# Patient Record
Sex: FEMALE | Race: WHITE | NOT HISPANIC OR LATINO | URBAN - METROPOLITAN AREA
[De-identification: names, ages, dates, MRNs, and addresses within clinical notes are randomized per-mention and may not be internally consistent; named-entity substitution may affect disease eponyms.]

---

## 2023-08-05 ENCOUNTER — INPATIENT (INPATIENT)
Facility: HOSPITAL | Age: 88
LOS: 4 days | Discharge: HOME HEALTH | End: 2023-08-10
Attending: INTERNAL MEDICINE | Admitting: INTERNAL MEDICINE
Payer: MEDICARE

## 2023-08-05 VITALS
TEMPERATURE: 98 F | SYSTOLIC BLOOD PRESSURE: 140 MMHG | HEART RATE: 163 BPM | WEIGHT: 119.93 LBS | DIASTOLIC BLOOD PRESSURE: 81 MMHG | OXYGEN SATURATION: 96 % | HEIGHT: 62 IN | RESPIRATION RATE: 18 BRPM

## 2023-08-05 DIAGNOSIS — I47.1 SUPRAVENTRICULAR TACHYCARDIA: ICD-10-CM

## 2023-08-05 DIAGNOSIS — I10 ESSENTIAL (PRIMARY) HYPERTENSION: ICD-10-CM

## 2023-08-05 DIAGNOSIS — K21.9 GASTRO-ESOPHAGEAL REFLUX DISEASE WITHOUT ESOPHAGITIS: ICD-10-CM

## 2023-08-05 DIAGNOSIS — Z98.890 OTHER SPECIFIED POSTPROCEDURAL STATES: Chronic | ICD-10-CM

## 2023-08-05 DIAGNOSIS — E78.5 HYPERLIPIDEMIA, UNSPECIFIED: ICD-10-CM

## 2023-08-05 DIAGNOSIS — R79.89 OTHER SPECIFIED ABNORMAL FINDINGS OF BLOOD CHEMISTRY: ICD-10-CM

## 2023-08-05 DIAGNOSIS — I24.8 OTHER FORMS OF ACUTE ISCHEMIC HEART DISEASE: ICD-10-CM

## 2023-08-05 LAB
ALBUMIN SERPL ELPH-MCNC: 3.1 G/DL — LOW (ref 3.3–5)
ALP SERPL-CCNC: 74 U/L — SIGNIFICANT CHANGE UP (ref 40–120)
ALT FLD-CCNC: 40 U/L — SIGNIFICANT CHANGE UP (ref 12–78)
ANION GAP SERPL CALC-SCNC: 5 MMOL/L — SIGNIFICANT CHANGE UP (ref 5–17)
APTT BLD: 30 SEC — SIGNIFICANT CHANGE UP (ref 24.5–35.6)
AST SERPL-CCNC: 46 U/L — HIGH (ref 15–37)
BASOPHILS # BLD AUTO: 0.07 K/UL — SIGNIFICANT CHANGE UP (ref 0–0.2)
BASOPHILS NFR BLD AUTO: 0.7 % — SIGNIFICANT CHANGE UP (ref 0–2)
BILIRUB SERPL-MCNC: 0.4 MG/DL — SIGNIFICANT CHANGE UP (ref 0.2–1.2)
BUN SERPL-MCNC: 25 MG/DL — HIGH (ref 7–23)
CALCIUM SERPL-MCNC: 8.6 MG/DL — SIGNIFICANT CHANGE UP (ref 8.5–10.1)
CHLORIDE SERPL-SCNC: 109 MMOL/L — HIGH (ref 96–108)
CHOLEST SERPL-MCNC: 192 MG/DL — SIGNIFICANT CHANGE UP
CO2 SERPL-SCNC: 26 MMOL/L — SIGNIFICANT CHANGE UP (ref 22–31)
CREAT SERPL-MCNC: 0.88 MG/DL — SIGNIFICANT CHANGE UP (ref 0.5–1.3)
EGFR: 62 ML/MIN/1.73M2 — SIGNIFICANT CHANGE UP
EOSINOPHIL # BLD AUTO: 0.4 K/UL — SIGNIFICANT CHANGE UP (ref 0–0.5)
EOSINOPHIL NFR BLD AUTO: 4.2 % — SIGNIFICANT CHANGE UP (ref 0–6)
GLUCOSE SERPL-MCNC: 128 MG/DL — HIGH (ref 70–99)
HCT VFR BLD CALC: 40.3 % — SIGNIFICANT CHANGE UP (ref 34.5–45)
HDLC SERPL-MCNC: 79 MG/DL — SIGNIFICANT CHANGE UP
HGB BLD-MCNC: 12.9 G/DL — SIGNIFICANT CHANGE UP (ref 11.5–15.5)
IMM GRANULOCYTES NFR BLD AUTO: 0.3 % — SIGNIFICANT CHANGE UP (ref 0–0.9)
INR BLD: 0.95 RATIO — SIGNIFICANT CHANGE UP (ref 0.85–1.18)
LIPID PNL WITH DIRECT LDL SERPL: 88 MG/DL — SIGNIFICANT CHANGE UP
LYMPHOCYTES # BLD AUTO: 2.05 K/UL — SIGNIFICANT CHANGE UP (ref 1–3.3)
LYMPHOCYTES # BLD AUTO: 21.3 % — SIGNIFICANT CHANGE UP (ref 13–44)
MAGNESIUM SERPL-MCNC: 2.1 MG/DL — SIGNIFICANT CHANGE UP (ref 1.6–2.6)
MCHC RBC-ENTMCNC: 29.9 PG — SIGNIFICANT CHANGE UP (ref 27–34)
MCHC RBC-ENTMCNC: 32 G/DL — SIGNIFICANT CHANGE UP (ref 32–36)
MCV RBC AUTO: 93.3 FL — SIGNIFICANT CHANGE UP (ref 80–100)
MONOCYTES # BLD AUTO: 0.67 K/UL — SIGNIFICANT CHANGE UP (ref 0–0.9)
MONOCYTES NFR BLD AUTO: 7 % — SIGNIFICANT CHANGE UP (ref 2–14)
NEUTROPHILS # BLD AUTO: 6.39 K/UL — SIGNIFICANT CHANGE UP (ref 1.8–7.4)
NEUTROPHILS NFR BLD AUTO: 66.5 % — SIGNIFICANT CHANGE UP (ref 43–77)
NON HDL CHOLESTEROL: 113 MG/DL — SIGNIFICANT CHANGE UP
NRBC # BLD: 0 /100 WBCS — SIGNIFICANT CHANGE UP (ref 0–0)
NT-PROBNP SERPL-SCNC: 1628 PG/ML — HIGH (ref 0–450)
PLATELET # BLD AUTO: 239 K/UL — SIGNIFICANT CHANGE UP (ref 150–400)
POTASSIUM SERPL-MCNC: 4 MMOL/L — SIGNIFICANT CHANGE UP (ref 3.5–5.3)
POTASSIUM SERPL-SCNC: 4 MMOL/L — SIGNIFICANT CHANGE UP (ref 3.5–5.3)
PROT SERPL-MCNC: 6.7 GM/DL — SIGNIFICANT CHANGE UP (ref 6–8.3)
PROTHROM AB SERPL-ACNC: 11.3 SEC — SIGNIFICANT CHANGE UP (ref 9.5–13)
RBC # BLD: 4.32 M/UL — SIGNIFICANT CHANGE UP (ref 3.8–5.2)
RBC # FLD: 14.6 % — HIGH (ref 10.3–14.5)
SODIUM SERPL-SCNC: 140 MMOL/L — SIGNIFICANT CHANGE UP (ref 135–145)
TRIGL SERPL-MCNC: 147 MG/DL — SIGNIFICANT CHANGE UP
TROPONIN I, HIGH SENSITIVITY RESULT: 152.1 NG/L — HIGH
TROPONIN I, HIGH SENSITIVITY RESULT: 28.4 NG/L — SIGNIFICANT CHANGE UP
TROPONIN I, HIGH SENSITIVITY RESULT: 445.5 NG/L — HIGH
TSH SERPL-MCNC: 2.26 UIU/ML — SIGNIFICANT CHANGE UP (ref 0.36–3.74)
WBC # BLD: 9.61 K/UL — SIGNIFICANT CHANGE UP (ref 3.8–10.5)
WBC # FLD AUTO: 9.61 K/UL — SIGNIFICANT CHANGE UP (ref 3.8–10.5)

## 2023-08-05 PROCEDURE — 71045 X-RAY EXAM CHEST 1 VIEW: CPT | Mod: 26

## 2023-08-05 PROCEDURE — 93306 TTE W/DOPPLER COMPLETE: CPT | Mod: 26

## 2023-08-05 PROCEDURE — 99291 CRITICAL CARE FIRST HOUR: CPT

## 2023-08-05 PROCEDURE — 93356 MYOCRD STRAIN IMG SPCKL TRCK: CPT

## 2023-08-05 PROCEDURE — 93010 ELECTROCARDIOGRAM REPORT: CPT | Mod: 77

## 2023-08-05 PROCEDURE — 99222 1ST HOSP IP/OBS MODERATE 55: CPT

## 2023-08-05 PROCEDURE — 99223 1ST HOSP IP/OBS HIGH 75: CPT

## 2023-08-05 PROCEDURE — 93010 ELECTROCARDIOGRAM REPORT: CPT | Mod: 76

## 2023-08-05 RX ORDER — ENOXAPARIN SODIUM 100 MG/ML
40 INJECTION SUBCUTANEOUS EVERY 24 HOURS
Refills: 0 | Status: DISCONTINUED | OUTPATIENT
Start: 2023-08-05 | End: 2023-08-10

## 2023-08-05 RX ORDER — DILTIAZEM HCL 120 MG
30 CAPSULE, EXT RELEASE 24 HR ORAL ONCE
Refills: 0 | Status: COMPLETED | OUTPATIENT
Start: 2023-08-05 | End: 2023-08-05

## 2023-08-05 RX ORDER — LANOLIN ALCOHOL/MO/W.PET/CERES
3 CREAM (GRAM) TOPICAL AT BEDTIME
Refills: 0 | Status: DISCONTINUED | OUTPATIENT
Start: 2023-08-05 | End: 2023-08-10

## 2023-08-05 RX ORDER — ONDANSETRON 8 MG/1
4 TABLET, FILM COATED ORAL EVERY 8 HOURS
Refills: 0 | Status: DISCONTINUED | OUTPATIENT
Start: 2023-08-05 | End: 2023-08-07

## 2023-08-05 RX ORDER — METOPROLOL TARTRATE 50 MG
25 TABLET ORAL DAILY
Refills: 0 | Status: DISCONTINUED | OUTPATIENT
Start: 2023-08-05 | End: 2023-08-07

## 2023-08-05 RX ORDER — FUROSEMIDE 40 MG
20 TABLET ORAL DAILY
Refills: 0 | Status: DISCONTINUED | OUTPATIENT
Start: 2023-08-05 | End: 2023-08-07

## 2023-08-05 RX ORDER — ACETAMINOPHEN 500 MG
650 TABLET ORAL EVERY 6 HOURS
Refills: 0 | Status: DISCONTINUED | OUTPATIENT
Start: 2023-08-05 | End: 2023-08-10

## 2023-08-05 RX ORDER — DILTIAZEM HCL 120 MG
10 CAPSULE, EXT RELEASE 24 HR ORAL ONCE
Refills: 0 | Status: COMPLETED | OUTPATIENT
Start: 2023-08-05 | End: 2023-08-05

## 2023-08-05 RX ORDER — ATORVASTATIN CALCIUM 80 MG/1
20 TABLET, FILM COATED ORAL AT BEDTIME
Refills: 0 | Status: DISCONTINUED | OUTPATIENT
Start: 2023-08-05 | End: 2023-08-10

## 2023-08-05 RX ADMIN — Medication 30 MILLIGRAM(S): at 03:00

## 2023-08-05 RX ADMIN — Medication 10 MILLIGRAM(S): at 02:32

## 2023-08-05 RX ADMIN — Medication 20 MILLIGRAM(S): at 06:22

## 2023-08-05 RX ADMIN — ENOXAPARIN SODIUM 40 MILLIGRAM(S): 100 INJECTION SUBCUTANEOUS at 06:24

## 2023-08-05 RX ADMIN — ATORVASTATIN CALCIUM 20 MILLIGRAM(S): 80 TABLET, FILM COATED ORAL at 21:58

## 2023-08-05 NOTE — CONSULT NOTE ADULT - ASSESSMENT
89F HTN, HLD who experienced an episode of diaphoresis and chest pressure, found to be in SVT that broke with cardizem.    Troponin mildly elevated, possibly demand  -check TTE  -cont metoprolol; bradycardia may preclude safe uptitration  -cont IV furosemide as the pt remains volume overloaded

## 2023-08-05 NOTE — ED ADULT NURSE NOTE - OBJECTIVE STATEMENT
Patient A&Ox4 presents to ED c/o pressure in chest. As per patient she was at her assisted living facility tonight when she became diaphoretic and developed a "pressure" in her chest. Patient denies pain, SOB, numbness, tingling, headache, dizziness, unilateal weakness, cough, sore throat, f/c, N/V/D. Patietn denies similar episodes in past. PMH HTN, HLD. NKDA. Upon presentation to ED patient not diaphoretic but tachy in 160s. Patient placed in room on cardiac monitor, IV access placed, EKG completed, Dr. Zamarripa called to bedside.

## 2023-08-05 NOTE — ED PROVIDER NOTE - CRITICAL CARE ATTENDING CONTRIBUTION TO CARE
Upon my evaluation, this patient had a high probability of imminent or life-threatening deterioration due to SVT which required my direct attention, intervention, and personal management.  The patient has a  medical condition that impairs one or more vital organ systems.  Frequent personal assessment and adjustment of medical interventions was performed.      I have personally provided 45 minutes of critical care time exclusive of time spent on separately billable procedures. Time includes review of laboratory data, radiology results, discussion with consultants, patient and family; monitoring for potential decompensation, as well as time spent retrieving data and reviewing the chart and documenting the visit. Interventions were performed as documented above.

## 2023-08-05 NOTE — ED ADULT NURSE NOTE - NURSING ED SKIN COLOR
Show Applicator Variable?: Yes Detail Level: Detailed Consent: The patient's consent was obtained including but not limited to risks of crusting, scabbing, blistering, scarring, darker or lighter pigmentary change, recurrence, incomplete removal and infection. Render Post-Care Instructions In Note?: no Duration Of Freeze Thaw-Cycle (Seconds): 0 Post-Care Instructions: I reviewed with the patient in detail post-care instructions. Patient is to wear sunprotection, and avoid picking at any of the treated lesions. Pt may apply Vaseline to crusted or scabbing areas. normal for race

## 2023-08-05 NOTE — H&P ADULT - NSHPLABSRESULTS_GEN_ALL_CORE
LABS:                        12.9   9.61  )-----------( 239      ( 05 Aug 2023 02:43 )             40.3     08-05    140  |  109<H>  |  25<H>  ----------------------------<  128<H>  4.0   |  26  |  0.88    Ca    8.6      05 Aug 2023 02:43  Mg     2.1     08-05    TPro  6.7  /  Alb  3.1<L>  /  TBili  0.4  /  DBili  x   /  AST  46<H>  /  ALT  40  /  AlkPhos  74  08-05    PT/INR - ( 05 Aug 2023 02:43 )   PT: 11.3 sec;   INR: 0.95 ratio         CXR: cardiomegaly with pulmonary vascular congestion per my read. Official report pending.  PTT - ( 05 Aug 2023 02:43 )  PTT:30.0 sec

## 2023-08-05 NOTE — ED ADULT NURSE REASSESSMENT NOTE - NS ED NURSE REASSESS COMMENT FT1
Patient on cardiac monitor with IV access. Dr. Zamarripa at bedside. Valsalva maneuver attempted. Cardizem given IV push.

## 2023-08-05 NOTE — ED PROVIDER NOTE - PHYSICAL EXAMINATION
General appearance: Nontoxic appearing, conversant, afebrile    Eyes: anicteric sclerae, NELLIE, EOMI   HENT: Atraumatic; oropharynx clear, MMM and no ulcerations, no pharyngeal erythema or exudate   Neck: Trachea midline; Full range of motion, supple   Pulm: CTA bl, normal respiratory effort and no intercostal retractions, normal work of breathing   CV: Tachycardic, regular, No murmurs, rubs, or gallops   Abdomen: Soft, non-tender, non-distended; no guarding or rebound   Extremities: No peripheral edema, no gross deformities, FROM x4   Skin: Dry, normal temperature, turgor and texture; no rash   Psych: Appropriate affect, cooperative

## 2023-08-05 NOTE — ED ADULT NURSE NOTE - NSFALLUNIVINTERV_ED_ALL_ED
Bed/Stretcher in lowest position, wheels locked, appropriate side rails in place/Call bell, personal items and telephone in reach/Instruct patient to call for assistance before getting out of bed/chair/stretcher/Non-slip footwear applied when patient is off stretcher/[place] to call system/Physically safe environment - no spills, clutter or unnecessary equipment/Purposeful proactive rounding/Room/bathroom lighting operational, light cord in reach

## 2023-08-05 NOTE — H&P ADULT - ASSESSMENT
The patient is a 89y Female, a resident of an assisted living facility with significant PMH of HTN and HPL was BIBA with c/o chest pressure and elevated HR and BP for further evaluation.  Patient is very sharp and good historian as per her age. She says that she felt perspiration and sweating with central chest pressure, and woke from her sleep due to this. She went down to see the facility nurse, and she was told that her HR BP were high. She was also feeling abnormal sensation in her chest at that time, but denied typical chest pain or sob.  Upon arrival in ED, she was found to be in SVT with HR of 162 bpm. Cardizem 10 mg IV and 20 mg po given in ED, and converted to sinus rhythm. Currently, she is in sinus rhythm with HR in 60s, and she is feeling much better.  She says that she takes only two pills daily- one for BP in the morning and other for cholesterol in the evening. She thinks that she takes Bystolic and Lipitor, but not able to remember their strength. Otherwise, she denies fever, chills, chest pain, sob, palpitation, N/V, abdominal pain, diarrhea or dysuria. Denies smoking, ETOH or substance abuse. her father had some type of cancer, and mother  at 92 years of natural cause.      # SVT possibly due to developing CHF.  Converted to SR after Cardizem 10 mg IV and 20 mg po given in ED.  Pro-BNP elevated. Troponin x1 normal.  CXR: cardiomegaly with pulmonary vascular congestion per my read. Official report pending.  Admit to telemetry.  Cardiac diet.  Lipid panel, troponin, and TSH level.  2D Echo in am.  Bystolic not available in inpatient pharmacy.  Will start Toprol-XL.  Lasix 20 mg IV x 1 dose.  Consult cardiology in am.    # HTN and HPL  Stable and controlled.  Continue her Lipitor.    # DVT prophylaxis: Lovenox sq daily.    Plan of care d/w the patient in detail at bedside, and she verbalized understanding.

## 2023-08-05 NOTE — H&P ADULT - HISTORY OF PRESENT ILLNESS
Chief Complaint: heavy sensation in the chest.    The patient is a 89y Female, a resident of an assisted living facility with significant PMH of HTN and HPL was BIBA with c/o chest pressure and elevated HR and BP for further evaluation.  Patient is very sharp and good historian as per her age. She says that she felt perspiration and sweating with central chest pressure, and woke from her sleep due to this. She went down to see the facility nurse, and she was told that her HR BP were high. She was also feeling abnormal sensation in her chest at that time, but denied typical chest pain or sob.  Upon arrival in ED, she was found to be in SVT with HR of 162 bpm. Cardizem 10 mg IV and 20 mg po given in ED, and converted to sinus rhythm. Currently, she is in sinus rhythm with HR in 60s, and she is feeling much better.  She says that she takes only two pills daily- one for BP in the morning and other for cholesterol in the evening. She thinks that she takes Bystolic and Lipitor, but not able to remember their strength. Otherwise, she denies fever, chills, chest pain, sob, palpitation, N/V, abdominal pain, diarrhea or dysuria. Denies smoking, ETOH or substance abuse. her father had some type of cancer, and mother  at 92 years of natural cause.    Significant labs: Pro-BNP 1628, Troponin 28, Mg 2.1, K 4.0, otherwise, CBC and CMP wnl.    Initial  bpm in SVT and latter on 73 bpm in SR.    CXR: cardiomegaly with pulmonary vascular congestion per my read. Official report pending.

## 2023-08-05 NOTE — CONSULT NOTE ADULT - SUBJECTIVE AND OBJECTIVE BOX
Cardiology Initial Consult    [facility] - Cardiology at [place]  [address] [place] [address], [place] [address]  Office: [phone]  Fax: [phone]    CHIEF COMPLAINT:  diaphoresis    HISTORY OF PRESENT ILLNESS:  89F HTN, HLD who experienced an episode of diaphoresis and chest pressure, found to be in SVT that broke with cardizem.  Feels some HALL a bit more than before.    Allergies  No Known Allergies    Intolerances    MEDICATIONS:  enoxaparin Injectable 40 milliGRAM(s) SubCutaneous every 24 hours  furosemide   Injectable 20 milliGRAM(s) IV Push daily  metoprolol succinate ER 25 milliGRAM(s) Oral daily  acetaminophen     Tablet .. 650 milliGRAM(s) Oral every 6 hours PRN  melatonin 3 milliGRAM(s) Oral at bedtime PRN  ondansetron Injectable 4 milliGRAM(s) IV Push every 8 hours PRN  aluminum hydroxide/magnesium hydroxide/simethicone Suspension 30 milliLiter(s) Oral every 4 hours PRN  atorvastatin 20 milliGRAM(s) Oral at bedtime    PAST MEDICAL & SURGICAL HISTORY:  HTN (hypertension)  Hyperlipidemia  S/P lumpectomy, left breast    FAMILY HISTORY:    SOCIAL HISTORY:    [x] Non-smoker  [ ] Smoker  [ ] Alcohol    Review of Systems:  Constitutional: [ -] Fever [ -] Chills [ ] Fatigue [ ] Weight change   HEENT: [ ] Blurred vision [ ] Eye pain [- ] Headache [ ] Runny nose [ ] Sore throat   Respiratory: [ ] Cough [ ] Wheezing [ x] Shortness of breath  Cardiovascular: [x ] Chest Pain [ ] Palpitations [ x] HALL [ ] PND [ ] Orthopnea  Gastrointestinal: [- ] Abdominal Pain [ ] Diarrhea [ ] Constipation [ ] Hemorrhoids [ ] Nausea [ ] Vomiting  Genitourinary: [ ] Nocturia [ -] Dysuria [ ] Incontinence  Extremities: [-] Swelling [ ] Joint Pain  Neurologic: [ ] Focal deficit [ ] Paresthesias [- ] Syncope  Skin: [- ] Rash [ ] Ecchymoses [ ] Wounds [ ] Lesions  Psychiatry: [- ] Depression [ ] Suicidal/Homicidal ideation [ ] Anxiety [ ] Sleep disturbances  [x ] 10 point review of systems is otherwise negative except as mentioned above            [ ]Unable to obtain    PHYSICAL EXAM:  T(C): 36.6 (08-05-23 @ 16:30), Max: 36.8 (08-05-23 @ 05:12)  HR: 69 (08-05-23 @ 16:30) (69 - 166)  BP: 163/91 (08-05-23 @ 16:30) (121/75 - 163/91)  RR: 18 (08-05-23 @ 16:30) (17 - 30)  SpO2: 98% (08-05-23 @ 16:30) (94% - 100%)  Wt(kg): --  I&O's Summary    05 Aug 2023 07:01  -  05 Aug 2023 16:37  --------------------------------------------------------  IN: 360 mL / OUT: 0 mL / NET: 360 mL    Appearance: mild dyspnea  HEENT:   mmm  Cardiovascular: Normal S1 S2, +elevated JVP, 2/6 systolic murmurs RUSB, no edema  Respiratory: Lungs clear to auscultation, mild crackles anteriorly, good air movement  Psychiatry: A & O x 3, Mood & affect appropriate  Gastrointestinal:  soft nt  Skin: No rashes, no ecchymoses, no cyanosis	  Neurologic: grossly non-focal    LABS:	 	  CBC Full  -  ( 05 Aug 2023 02:43 )  WBC Count : 9.61 K/uL  Hemoglobin : 12.9 g/dL  Hematocrit : 40.3 %  Platelet Count - Automated : 239 K/uL    08-05  140  |  109<H>  |  25<H>  ----------------------------<  128<H>  4.0   |  26  |  0.88    Ca    8.6      05 Aug 2023 02:43  Mg     2.1     08-05    TPro  6.7  /  Alb  3.1<L>  /  TBili  0.4  /  DBili  x   /  AST  46<H>  /  ALT  40  /  AlkPhos  74  08-05      proBNP:   Lipid Profile:   HgA1c:   TSH: Thyroid Stimulating Hormone, Serum: 2.260 uIU/mL (08-05 @ 05:02)      CARDIAC MARKERS:  Troponin I, High Sensitivity Result: 445.5 ng/L (08-05-23 @ 14:47)  Troponin I, High Sensitivity Result: 152.1 ng/L (08-05-23 @ 05:02)    TELEMETRY: 	  SR/SB  ECG:  	SVT, possible AVNRT, ?ST depressions  RADIOLOGY:  OTHER: 	    PREVIOUS DIAGNOSTIC TESTING:    [ ] Echocardiogram:   [ ] Catheterization:  [ ] Stress Test:

## 2023-08-05 NOTE — ED PROVIDER NOTE - CLINICAL SUMMARY MEDICAL DECISION MAKING FREE TEXT BOX
89yo female with pmh htn, hld, p/w chest pressure.  Woke her from sleep tonight.  Never happened before.  No sob, fever, cough, edema.  Denies cardiac hx.  Noted to be tachy 160s, svt on ecg.  Normotensive.  Attempted vagal maneuvers, blowing into syringe without success.  Given diltiazem with improvement in rate and no longer with cp.  Will get labs eval for electrolytes, acs.  Plan for labs, xr, tele, reassess.

## 2023-08-05 NOTE — ED ADULT NURSE NOTE - CHPI ED NUR SYMPTOMS NEG
denies pain, SOB, numbness, tingling, headache, dizziness, unilateal weakness, cough, sore throat, f/c, N/V/D

## 2023-08-05 NOTE — ED PROVIDER NOTE - PROGRESS NOTE DETAILS
Dallin: Sinus on monitor 60-70s.  Intermittently drops 40s junctional but resolves in seconds and asymptomatic during this, normotensive.  Given ccb which is likely cause.  Will admit for further tele monitoring and evaluation.

## 2023-08-05 NOTE — ED ADULT NURSE NOTE - ED STAT RN HANDOFF DETAILS
Report given to JOSE LUIS Ordaz. RN made aware of patient PMH and HPI. RN made aware of medications given in ED, IV access, lab results. VSS, no signs of distress noted or verbalized. Patient A&Ox4. RN made aware that patient HR occasionally dips to 40s.

## 2023-08-06 PROCEDURE — 99232 SBSQ HOSP IP/OBS MODERATE 35: CPT

## 2023-08-06 PROCEDURE — 99222 1ST HOSP IP/OBS MODERATE 55: CPT | Mod: AI

## 2023-08-06 RX ORDER — IPRATROPIUM BROMIDE 0.2 MG/ML
500 SOLUTION, NON-ORAL INHALATION ONCE
Refills: 0 | Status: COMPLETED | OUTPATIENT
Start: 2023-08-06 | End: 2023-08-06

## 2023-08-06 RX ORDER — LOSARTAN POTASSIUM 100 MG/1
50 TABLET, FILM COATED ORAL DAILY
Refills: 0 | Status: DISCONTINUED | OUTPATIENT
Start: 2023-08-06 | End: 2023-08-09

## 2023-08-06 RX ORDER — LEVALBUTEROL 1.25 MG/.5ML
0.63 SOLUTION, CONCENTRATE RESPIRATORY (INHALATION) EVERY 6 HOURS
Refills: 0 | Status: DISCONTINUED | OUTPATIENT
Start: 2023-08-06 | End: 2023-08-10

## 2023-08-06 RX ORDER — LABETALOL HCL 100 MG
10 TABLET ORAL ONCE
Refills: 0 | Status: COMPLETED | OUTPATIENT
Start: 2023-08-06 | End: 2023-08-06

## 2023-08-06 RX ADMIN — Medication 500 MICROGRAM(S): at 00:53

## 2023-08-06 RX ADMIN — Medication 10 MILLIGRAM(S): at 01:18

## 2023-08-06 RX ADMIN — Medication 10 MILLIGRAM(S): at 08:34

## 2023-08-06 RX ADMIN — LOSARTAN POTASSIUM 50 MILLIGRAM(S): 100 TABLET, FILM COATED ORAL at 12:41

## 2023-08-06 RX ADMIN — Medication 20 MILLIGRAM(S): at 05:42

## 2023-08-06 RX ADMIN — ENOXAPARIN SODIUM 40 MILLIGRAM(S): 100 INJECTION SUBCUTANEOUS at 05:42

## 2023-08-06 RX ADMIN — Medication 25 MILLIGRAM(S): at 05:43

## 2023-08-06 RX ADMIN — ATORVASTATIN CALCIUM 20 MILLIGRAM(S): 80 TABLET, FILM COATED ORAL at 22:03

## 2023-08-06 NOTE — PROGRESS NOTE ADULT - ASSESSMENT
89F HTN, HLD who experienced an episode of diaphoresis and chest pressure, found to be in SVT that broke with cardizem.    Troponin mildly elevated, possibly demand  -check TTE  -increase metoprolol  -BP control - on losartan  -nearing euvolemic, likely can stop IV diuretics

## 2023-08-06 NOTE — PROGRESS NOTE ADULT - ASSESSMENT
The patient is a 89y Female, a resident of an assisted living facility with significant PMH of HTN and HPL was BIBA with c/o chest pressure and elevated HR and BP for further evaluation.  Patient is very sharp and good historian as per her age. She says that she felt perspiration and sweating with central chest pressure, and woke from her sleep due to this. She went down to see the facility nurse, and she was told that her HR BP were high. She was also feeling abnormal sensation in her chest at that time, but denied typical chest pain or sob.  Upon arrival in ED, she was found to be in SVT with HR of 162 bpm. Cardizem 10 mg IV and 20 mg po given in ED, and converted to sinus rhythm. Currently, she is in sinus rhythm with HR in 60s, and she is feeling much better.  She says that she takes only two pills daily- one for BP in the morning and other for cholesterol in the evening. She thinks that she takes Bystolic and Lipitor, but not able to remember their strength. Otherwise, she denies fever, chills, chest pain, sob, palpitation, N/V, abdominal pain, diarrhea or dysuria. Denies smoking, ETOH or substance abuse. her father had some type of cancer, and mother  at 92 years of natural cause.      # SVT, possibly due to developing CHF.  Converted to SR after Cardizem 10 mg IV and 20 mg po given in ED.  Lipid panel, troponin, and TSH level.  2D Echo  Continue BBlocker.  Cardiology f/u appreciated.    Lasix 20 mg IV x 1 dose.  Consult cardiology in am.    # HTN and HPL  Stable and controlled.  Continue her Lipitor.    # DVT prophylaxis: Lovenox sq daily.   The patient is a 89y Female, a resident of an assisted living facility with significant PMH of HTN and HPL was BIBA with c/o chest pressure and elevated HR and BP for further evaluation.  Patient is very sharp and good historian as per her age. She says that she felt perspiration and sweating with central chest pressure, and woke from her sleep due to this. She went down to see the facility nurse, and she was told that her HR BP were high. She was also feeling abnormal sensation in her chest at that time, but denied typical chest pain or sob.  Upon arrival in ED, she was found to be in SVT with HR of 162 bpm. Cardizem 10 mg IV and 20 mg po given in ED, and converted to sinus rhythm. Currently, she is in sinus rhythm with HR in 60s, and she is feeling much better.  She says that she takes only two pills daily- one for BP in the morning and other for cholesterol in the evening. She thinks that she takes Bystolic and Lipitor, but not able to remember their strength. Otherwise, she denies fever, chills, chest pain, sob, palpitation, N/V, abdominal pain, diarrhea or dysuria. Denies smoking, ETOH or substance abuse. her father had some type of cancer, and mother  at 92 years of natural cause.      # SVT, possibly due to developing CHF.  Converted to SR after Cardizem 10 mg IV and 20 mg po given in ED.  Lipid panel, troponin, and TSH level.  2D Echo  Continue BBlocker.  Cardiology f/u appreciated.    Lasix 20 mg IV x 1 dose.    # Elevated Troponin - slight elevated trending upward.  Pt without symtpoms.  ? demand ischemia.  Cardiology input noted.  Will continue to trend.        # HTN and HPL  Stable and controlled.  Continue her Lipitor.    # DVT prophylaxis: Lovenox sq daily.

## 2023-08-06 NOTE — PHYSICAL THERAPY INITIAL EVALUATION ADULT - PERTINENT HX OF CURRENT PROBLEM, REHAB EVAL
Patient admitted with chest pressure and increased HR and blood pressure. Elevated troponin attributed to demand per cardiology.

## 2023-08-06 NOTE — PROGRESS NOTE ADULT - SUBJECTIVE AND OBJECTIVE BOX
Cardiology Progress Note    Interval Events:  No significant events      MEDICATIONS:  enoxaparin Injectable 40 milliGRAM(s) SubCutaneous every 24 hours  furosemide   Injectable 20 milliGRAM(s) IV Push daily  losartan 50 milliGRAM(s) Oral daily  metoprolol succinate ER 25 milliGRAM(s) Oral daily  levalbuterol Inhalation 0.63 milliGRAM(s) Inhalation every 6 hours PRN  acetaminophen     Tablet .. 650 milliGRAM(s) Oral every 6 hours PRN  melatonin 3 milliGRAM(s) Oral at bedtime PRN  ondansetron Injectable 4 milliGRAM(s) IV Push every 8 hours PRN  aluminum hydroxide/magnesium hydroxide/simethicone Suspension 30 milliLiter(s) Oral every 4 hours PRN  atorvastatin 20 milliGRAM(s) Oral at bedtime    PHYSICAL EXAM:  T(C): 37.3 (08-06-23 @ 12:15), Max: 37.3 (08-06-23 @ 12:15)  HR: 84 (08-06-23 @ 12:15) (69 - 97)  BP: 133/79 (08-06-23 @ 12:15) (130/68 - 216/131)  RR: 17 (08-06-23 @ 12:15) (17 - 18)  SpO2: 96% (08-06-23 @ 12:15) (93% - 100%)  Wt(kg): --  I&O's Summary    05 Aug 2023 07:01  -  06 Aug 2023 07:00  --------------------------------------------------------  IN: 360 mL / OUT: 900 mL / NET: -540 mL    Appearance: NAD  HEENT:   mmm  Cardiovascular: Normal S1 S2, nl  JVP, 2/6 systolic murmurs RUSB, no edema  Respiratory: Lungs clear to auscultation, good air movement  Psychiatry: A & O x 3, Mood & affect appropriate  Gastrointestinal:  soft nt  Skin: No rashes, no ecchymoses, no cyanosis	  Neurologic: grossly non-focal    LABS:	 	  CBC Full  -  ( 05 Aug 2023 02:43 )  WBC Count : 9.61 K/uL  Hemoglobin : 12.9 g/dL  Hematocrit : 40.3 %  Platelet Count - Automated : 239 K/uL  Mean Cell Volume : 93.3 fl  Mean Cell Hemoglobin : 29.9 pg  Mean Cell Hemoglobin Concentration : 32.0 g/dL  Auto Neutrophil # : 6.39 K/uL  Auto Lymphocyte # : 2.05 K/uL  Auto Monocyte # : 0.67 K/uL  Auto Eosinophil # : 0.40 K/uL  Auto Basophil # : 0.07 K/uL  Auto Neutrophil % : 66.5 %  Auto Lymphocyte % : 21.3 %  Auto Monocyte % : 7.0 %  Auto Eosinophil % : 4.2 %  Auto Basophil % : 0.7 %    08-05    140  |  109<H>  |  25<H>  ----------------------------<  128<H>  4.0   |  26  |  0.88    Ca    8.6      05 Aug 2023 02:43  Mg     2.1     08-05    TPro  6.7  /  Alb  3.1<L>  /  TBili  0.4  /  DBili  x   /  AST  46<H>  /  ALT  40  /  AlkPhos  74  08-05      proBNP:   Lipid Profile:   HgA1c:   TSH: Thyroid Stimulating Hormone, Serum: 2.260 uIU/mL (08-05 @ 05:02)      CARDIAC MARKERS:  Troponin I, High Sensitivity Result: 445.5 ng/L (08-05-23 @ 14:47)  Troponin I, High Sensitivity Result: 152.1 ng/L (08-05-23 @ 05:02)  Troponin I, High Sensitivity Result: 28.4 ng/L (08-05-23 @ 02:43)    TELEMETRY: 	  SR 90s  ECG:  	  RADIOLOGY:  OTHER: 	    PREVIOUS DIAGNOSTIC TESTING:    [ ] Echocardiogram:   [ ] Catheterization:  [ ] Stress Test:

## 2023-08-06 NOTE — PROGRESS NOTE ADULT - SUBJECTIVE AND OBJECTIVE BOX
Patient: MISAEL STACK [id number] 89y Female                            Hospitalist Attending Note    No complaints.  No chest pain / palpitations. No SOB.     ____________________PHYSICAL EXAM:  GENERAL:  NAD Alert and Oriented x 3 Pleasant.   HEENT: NCAT  CARDIOVASCULAR:  S1, S2  LUNGS: CTAB  ABDOMEN:  soft, (-) tenderness, (-) distension, (+) bowel sounds, (-) guarding, (-) rebound (-) rigidity  EXTREMITIES:  no cyanosis / clubbing / edema.   ____________________     VITALS:  Vital Signs Last 24 Hrs  T(C): 37.3 (06 Aug 2023 12:15), Max: 37.3 (06 Aug 2023 12:15)  T(F): 99.1 (06 Aug 2023 12:15), Max: 99.1 (06 Aug 2023 12:15)  HR: 75 (06 Aug 2023 15:16) (70 - 97)  BP: 156/94 (06 Aug 2023 15:16) (130/68 - 216/131)  BP(mean): --  RR: 18 (06 Aug 2023 15:16) (17 - 18)  SpO2: 96% (06 Aug 2023 15:16) (93% - 100%)    Parameters below as of 06 Aug 2023 15:16  Patient On (Oxygen Delivery Method): room air     Daily     Daily Weight in k.6 (06 Aug 2023 04:41)  CAPILLARY BLOOD GLUCOSE        I&O's Summary    05 Aug 2023 07:01  -  06 Aug 2023 07:00  --------------------------------------------------------  IN: 360 mL / OUT: 900 mL / NET: -540 mL        HISTORY:  PAST MEDICAL & SURGICAL HISTORY:  HTN (hypertension)      Hyperlipidemia      S/P lumpectomy, left breast      Allergies    No Known Allergies    Intolerances       LABS:                        12.9   9.61  )-----------( 239      ( 05 Aug 2023 02:43 )             40.3     08-05    140  |  109<H>  |  25<H>  ----------------------------<  128<H>  4.0   |  26  |  0.88    Ca    8.6      05 Aug 2023 02:43  Mg     2.1     08-    TPro  6.7  /  Alb  3.1<L>  /  TBili  0.4  /  DBili  x   /  AST  46<H>  /  ALT  40  /  AlkPhos  74  08-05    PT/INR - ( 05 Aug 2023 02:43 )   PT: 11.3 sec;   INR: 0.95 ratio         PTT - ( 05 Aug 2023 02:43 )  PTT:30.0 sec  LIVER FUNCTIONS - ( 05 Aug 2023 02:43 )  Alb: 3.1 g/dL / Pro: 6.7 gm/dL / ALK PHOS: 74 U/L / ALT: 40 U/L / AST: 46 U/L / GGT: x           Urinalysis Basic - ( 05 Aug 2023 02:43 )    Color: x / Appearance: x / SG: x / pH: x  Gluc: 128 mg/dL / Ketone: x  / Bili: x / Urobili: x   Blood: x / Protein: x / Nitrite: x   Leuk Esterase: x / RBC: x / WBC x   Sq Epi: x / Non Sq Epi: x / Bacteria: x              MEDICATIONS:  MEDICATIONS  (STANDING):  atorvastatin 20 milliGRAM(s) Oral at bedtime  enoxaparin Injectable 40 milliGRAM(s) SubCutaneous every 24 hours  furosemide   Injectable 20 milliGRAM(s) IV Push daily  losartan 50 milliGRAM(s) Oral daily  metoprolol succinate ER 25 milliGRAM(s) Oral daily    MEDICATIONS  (PRN):  acetaminophen     Tablet .. 650 milliGRAM(s) Oral every 6 hours PRN Temp greater or equal to 38C (100.4F), Mild Pain (1 - 3)  aluminum hydroxide/magnesium hydroxide/simethicone Suspension 30 milliLiter(s) Oral every 4 hours PRN Dyspepsia  levalbuterol Inhalation 0.63 milliGRAM(s) Inhalation every 6 hours PRN shortness of breath and/or wheezing  melatonin 3 milliGRAM(s) Oral at bedtime PRN Insomnia  ondansetron Injectable 4 milliGRAM(s) IV Push every 8 hours PRN Nausea and/or Vomiting

## 2023-08-06 NOTE — PATIENT PROFILE ADULT - FALL HARM RISK - HARM RISK INTERVENTIONS
Assistance with ambulation/Assistance OOB with selected safe patient handling equipment/Communicate Risk of Fall with Harm to all staff/Discuss with provider need for PT consult/Monitor gait and stability/Reinforce activity limits and safety measures with patient and family/Tailored Fall Risk Interventions/Use of alarms - bed, chair and/or voice tab/Visual Cue: Yellow wristband and red socks/Bed in lowest position, wheels locked, appropriate side rails in place/Call bell, personal items and telephone in reach/Instruct patient to call for assistance before getting out of bed or chair/Non-slip footwear when patient is out of bed/[place] to call system/Physically safe environment - no spills, clutter or unnecessary equipment/Purposeful Proactive Rounding/Room/bathroom lighting operational, light cord in reach

## 2023-08-06 NOTE — PHYSICAL THERAPY INITIAL EVALUATION ADULT - REFERRING PHYSICIAN, REHAB EVAL
06/21/17 1330   Final Note   Assessment Type Final Discharge Note   Discharge Disposition Home   Discharge planning education complete? Yes   Hospital Follow Up  Appt(s) scheduled? Yes   Discharge plans and expectations educations in teach back method with documentation complete? Yes   Offered [facility] -- Bedside Delivery? n/a   Discharge/Hospital Encounter Summary to (non-[facility]) PCP Yes   Referral to Outpatient Case Management complete? n/a   Referral to / orders for Home Health Complete? n/a   30 day supply of medicines given at discharge, if documented non-compliance / non-adherence? n/a   Any social issues identified prior to discharge? n/a   Did you assess the readiness or willingness of the family or caregiver to support self management of care? Yes   Right Care Referral Info   Post Acute Recommendation No Care      Elmo Metcalf

## 2023-08-07 LAB
ANION GAP SERPL CALC-SCNC: 5 MMOL/L — SIGNIFICANT CHANGE UP (ref 5–17)
BUN SERPL-MCNC: 28 MG/DL — HIGH (ref 7–23)
CALCIUM SERPL-MCNC: 9.1 MG/DL — SIGNIFICANT CHANGE UP (ref 8.5–10.1)
CHLORIDE SERPL-SCNC: 103 MMOL/L — SIGNIFICANT CHANGE UP (ref 96–108)
CK MB BLD-MCNC: 2.7 % — SIGNIFICANT CHANGE UP (ref 0–3.5)
CK MB CFR SERPL CALC: 2.1 NG/ML — SIGNIFICANT CHANGE UP (ref 0.5–3.6)
CK SERPL-CCNC: 79 U/L — SIGNIFICANT CHANGE UP (ref 26–192)
CO2 SERPL-SCNC: 32 MMOL/L — HIGH (ref 22–31)
CREAT SERPL-MCNC: 0.88 MG/DL — SIGNIFICANT CHANGE UP (ref 0.5–1.3)
EGFR: 62 ML/MIN/1.73M2 — SIGNIFICANT CHANGE UP
GLUCOSE SERPL-MCNC: 140 MG/DL — HIGH (ref 70–99)
HCT VFR BLD CALC: 43.8 % — SIGNIFICANT CHANGE UP (ref 34.5–45)
HGB BLD-MCNC: 14.1 G/DL — SIGNIFICANT CHANGE UP (ref 11.5–15.5)
MCHC RBC-ENTMCNC: 29.6 PG — SIGNIFICANT CHANGE UP (ref 27–34)
MCHC RBC-ENTMCNC: 32.2 G/DL — SIGNIFICANT CHANGE UP (ref 32–36)
MCV RBC AUTO: 91.8 FL — SIGNIFICANT CHANGE UP (ref 80–100)
NRBC # BLD: 0 /100 WBCS — SIGNIFICANT CHANGE UP (ref 0–0)
PLATELET # BLD AUTO: 275 K/UL — SIGNIFICANT CHANGE UP (ref 150–400)
POTASSIUM SERPL-MCNC: 3.1 MMOL/L — LOW (ref 3.5–5.3)
POTASSIUM SERPL-SCNC: 3.1 MMOL/L — LOW (ref 3.5–5.3)
RBC # BLD: 4.77 M/UL — SIGNIFICANT CHANGE UP (ref 3.8–5.2)
RBC # FLD: 14.7 % — HIGH (ref 10.3–14.5)
SODIUM SERPL-SCNC: 140 MMOL/L — SIGNIFICANT CHANGE UP (ref 135–145)
TROPONIN I, HIGH SENSITIVITY RESULT: 98.7 NG/L — HIGH
WBC # BLD: 8.52 K/UL — SIGNIFICANT CHANGE UP (ref 3.8–10.5)
WBC # FLD AUTO: 8.52 K/UL — SIGNIFICANT CHANGE UP (ref 3.8–10.5)

## 2023-08-07 PROCEDURE — 99232 SBSQ HOSP IP/OBS MODERATE 35: CPT | Mod: FS

## 2023-08-07 PROCEDURE — 99232 SBSQ HOSP IP/OBS MODERATE 35: CPT

## 2023-08-07 PROCEDURE — 93010 ELECTROCARDIOGRAM REPORT: CPT

## 2023-08-07 RX ORDER — HYDRALAZINE HCL 50 MG
10 TABLET ORAL ONCE
Refills: 0 | Status: COMPLETED | OUTPATIENT
Start: 2023-08-07 | End: 2023-08-07

## 2023-08-07 RX ORDER — METOPROLOL TARTRATE 50 MG
50 TABLET ORAL DAILY
Refills: 0 | Status: DISCONTINUED | OUTPATIENT
Start: 2023-08-08 | End: 2023-08-09

## 2023-08-07 RX ORDER — POTASSIUM CHLORIDE 20 MEQ
40 PACKET (EA) ORAL EVERY 4 HOURS
Refills: 0 | Status: COMPLETED | OUTPATIENT
Start: 2023-08-07 | End: 2023-08-07

## 2023-08-07 RX ORDER — POTASSIUM CHLORIDE 20 MEQ
40 PACKET (EA) ORAL ONCE
Refills: 0 | Status: COMPLETED | OUTPATIENT
Start: 2023-08-07 | End: 2023-08-07

## 2023-08-07 RX ORDER — METOPROLOL TARTRATE 50 MG
25 TABLET ORAL ONCE
Refills: 0 | Status: COMPLETED | OUTPATIENT
Start: 2023-08-07 | End: 2023-08-07

## 2023-08-07 RX ORDER — METOPROLOL TARTRATE 50 MG
50 TABLET ORAL DAILY
Refills: 0 | Status: DISCONTINUED | OUTPATIENT
Start: 2023-08-08 | End: 2023-10-07

## 2023-08-07 RX ORDER — METOPROLOL TARTRATE 50 MG
5 TABLET ORAL ONCE
Refills: 0 | Status: COMPLETED | OUTPATIENT
Start: 2023-08-07 | End: 2023-08-07

## 2023-08-07 RX ORDER — PANTOPRAZOLE SODIUM 20 MG/1
40 TABLET, DELAYED RELEASE ORAL
Refills: 0 | Status: DISCONTINUED | OUTPATIENT
Start: 2023-08-07 | End: 2023-08-10

## 2023-08-07 RX ORDER — METOPROLOL TARTRATE 50 MG
50 TABLET ORAL DAILY
Refills: 0 | Status: DISCONTINUED | OUTPATIENT
Start: 2023-08-07 | End: 2023-08-07

## 2023-08-07 RX ORDER — FUROSEMIDE 40 MG
20 TABLET ORAL DAILY
Refills: 0 | Status: DISCONTINUED | OUTPATIENT
Start: 2023-08-08 | End: 2023-08-10

## 2023-08-07 RX ORDER — ONDANSETRON 8 MG/1
8 TABLET, FILM COATED ORAL EVERY 6 HOURS
Refills: 0 | Status: DISCONTINUED | OUTPATIENT
Start: 2023-08-07 | End: 2023-08-10

## 2023-08-07 RX ADMIN — ONDANSETRON 4 MILLIGRAM(S): 8 TABLET, FILM COATED ORAL at 15:25

## 2023-08-07 RX ADMIN — Medication 25 MILLIGRAM(S): at 13:08

## 2023-08-07 RX ADMIN — Medication 25 MILLIGRAM(S): at 05:25

## 2023-08-07 RX ADMIN — Medication 40 MILLIEQUIVALENT(S): at 21:44

## 2023-08-07 RX ADMIN — Medication 3 MILLIGRAM(S): at 21:49

## 2023-08-07 RX ADMIN — LOSARTAN POTASSIUM 50 MILLIGRAM(S): 100 TABLET, FILM COATED ORAL at 05:26

## 2023-08-07 RX ADMIN — Medication 40 MILLIEQUIVALENT(S): at 13:09

## 2023-08-07 RX ADMIN — Medication 5 MILLIGRAM(S): at 00:37

## 2023-08-07 RX ADMIN — Medication 10 MILLIGRAM(S): at 16:41

## 2023-08-07 RX ADMIN — PANTOPRAZOLE SODIUM 40 MILLIGRAM(S): 20 TABLET, DELAYED RELEASE ORAL at 13:08

## 2023-08-07 RX ADMIN — Medication 40 MILLIEQUIVALENT(S): at 18:01

## 2023-08-07 RX ADMIN — ENOXAPARIN SODIUM 40 MILLIGRAM(S): 100 INJECTION SUBCUTANEOUS at 05:42

## 2023-08-07 RX ADMIN — ATORVASTATIN CALCIUM 20 MILLIGRAM(S): 80 TABLET, FILM COATED ORAL at 21:38

## 2023-08-07 RX ADMIN — Medication 30 MILLILITER(S): at 16:41

## 2023-08-07 RX ADMIN — Medication 650 MILLIGRAM(S): at 11:16

## 2023-08-07 RX ADMIN — Medication 20 MILLIGRAM(S): at 05:26

## 2023-08-07 RX ADMIN — Medication 650 MILLIGRAM(S): at 10:19

## 2023-08-07 RX ADMIN — Medication 10 MILLIGRAM(S): at 05:25

## 2023-08-07 NOTE — PROGRESS NOTE ADULT - ASSESSMENT
The patient is a 89y Female, a resident of an assisted living facility with significant PMH of HTN and HPL was BIBA with c/o chest pressure and elevated HR and BP for further evaluation.  Patient is very sharp and good historian as per her age. She says that she felt perspiration and sweating with central chest pressure, and woke from her sleep due to this. She went down to see the facility nurse, and she was told that her HR BP were high. She was also feeling abnormal sensation in her chest at that time, but denied typical chest pain or sob.  Upon arrival in ED, she was found to be in SVT with HR of 162 bpm. Cardizem 10 mg IV and 20 mg po given in ED, and converted to sinus rhythm. Currently, she is in sinus rhythm with HR in 60s, and she is feeling much better.  She says that she takes only two pills daily- one for BP in the morning and other for cholesterol in the evening. She thinks that she takes Bystolic and Lipitor, but not able to remember their strength. Otherwise, she denies fever, chills, chest pain, sob, palpitation, N/V, abdominal pain, diarrhea or dysuria. Denies smoking, ETOH or substance abuse. her father had some type of cancer, and mother  at 92 years of natural cause.      # SVT, possibly due to developing CHF.  Converted to SR after Cardizem 10 mg IV and 20 mg po given in ED.  Lipid panel, troponin, and TSH level.  Continue BBlocker.  Cardiology recommendations d/w Dr. Perez.  TTE showed EF 50-55%.       # Elevated Troponin - slight elevated trending upward.  Pt without symtpoms.  Suspect demand ischemia.  Troponin now improved.  Cardiology input noted.  Will continue to trend.        # HTN and HPL  Stable and controlled.  Continue her Lipitor.    # DVT prophylaxis: Lovenox sq daily.    Plan of care d/w granddaughter  on  and 2023

## 2023-08-07 NOTE — PROGRESS NOTE ADULT - SUBJECTIVE AND OBJECTIVE BOX
Patient: MISAEL STACK [id number] 89y Female                            Hospitalist Attending Note    Episode of chest pain this am, noted to be tachycardic.  Symptoms have resolved.    ____________________PHYSICAL EXAM:  GENERAL:  NAD Alert and Oriented x 3 Pleasant.   HEENT: NCAT  CARDIOVASCULAR:  S1, S2  LUNGS: CTAB  ABDOMEN:  soft, (-) tenderness, (-) distension, (+) bowel sounds, (-) guarding, (-) rebound (-) rigidity  EXTREMITIES:  no cyanosis / clubbing / edema.   ____________________      VITALS:  Vital Signs Last 24 Hrs  T(C): 37 (07 Aug 2023 15:30), Max: 37.2 (06 Aug 2023 23:41)  T(F): 98.6 (07 Aug 2023 15:30), Max: 99 (06 Aug 2023 23:41)  HR: 116 (07 Aug 2023 17:14) (79 - 116)  BP: 165/82 (07 Aug 2023 17:14) (145/84 - 187/97)  BP(mean): --  RR: 16 (07 Aug 2023 17:14) (16 - 20)  SpO2: 95% (07 Aug 2023 17:14) (91% - 95%)    Parameters below as of 07 Aug 2023 17:14  Patient On (Oxygen Delivery Method): nasal cannula  O2 Flow (L/min): 2   Daily     Daily   CAPILLARY BLOOD GLUCOSE        I&O's Summary    06 Aug 2023 07:01  -  07 Aug 2023 07:00  --------------------------------------------------------  IN: 1000 mL / OUT: 1350 mL / NET: -350 mL    07 Aug 2023 07:01  -  07 Aug 2023 18:49  --------------------------------------------------------  IN: 300 mL / OUT: 0 mL / NET: 300 mL        LABS:                        14.1   8.52  )-----------( 275      ( 07 Aug 2023 07:00 )             43.8     08-07    140  |  103  |  28<H>  ----------------------------<  140<H>  3.1<L>   |  32<H>  |  0.88    Ca    9.1      07 Aug 2023 07:00          Urinalysis Basic - ( 07 Aug 2023 07:00 )    Color: x / Appearance: x / SG: x / pH: x  Gluc: 140 mg/dL / Ketone: x  / Bili: x / Urobili: x   Blood: x / Protein: x / Nitrite: x   Leuk Esterase: x / RBC: x / WBC x   Sq Epi: x / Non Sq Epi: x / Bacteria: x      CARDIAC MARKERS ( 07 Aug 2023 07:00 )  x     / x     / 79 U/L / x     / 2.1 ng/mL          MEDICATIONS:  acetaminophen     Tablet .. 650 milliGRAM(s) Oral every 6 hours PRN  aluminum hydroxide/magnesium hydroxide/simethicone Suspension 30 milliLiter(s) Oral every 4 hours PRN  atorvastatin 20 milliGRAM(s) Oral at bedtime  enoxaparin Injectable 40 milliGRAM(s) SubCutaneous every 24 hours  levalbuterol Inhalation 0.63 milliGRAM(s) Inhalation every 6 hours PRN  losartan 50 milliGRAM(s) Oral daily  melatonin 3 milliGRAM(s) Oral at bedtime PRN  ondansetron Injectable 8 milliGRAM(s) IV Push every 6 hours PRN  pantoprazole    Tablet 40 milliGRAM(s) Oral before breakfast  potassium chloride   Powder 40 milliEquivalent(s) Oral once

## 2023-08-07 NOTE — PROGRESS NOTE ADULT - SUBJECTIVE AND OBJECTIVE BOX
Patient is a 89y old  Female who presents with SVT (06 Aug 2023 16:37)    PAST MEDICAL & SURGICAL HISTORY:  HTN (hypertension)    Hyperlipidemia    S/P lumpectomy, left breast    INTERVAL HISTORY: c/o not feeling good and chest pressure after having breakfast, + anxious    	  MEDICATIONS:  MEDICATIONS  (STANDING):  atorvastatin 20 milliGRAM(s) Oral at bedtime  enoxaparin Injectable 40 milliGRAM(s) SubCutaneous every 24 hours  furosemide 20 milliGRAM(s) po  daily  losartan 50 milliGRAM(s) Oral daily  metoprolol succinate ER 50 milliGRAM(s) Oral daily  potassium chloride    Tablet ER 40 milliEquivalent(s) Oral every 4 hours    MEDICATIONS  (PRN):  acetaminophen     Tablet .. 650 milliGRAM(s) Oral every 6 hours PRN Temp greater or equal to 38C (100.4F), Mild Pain (1 - 3)  aluminum hydroxide/magnesium hydroxide/simethicone Suspension 30 milliLiter(s) Oral every 4 hours PRN Dyspepsia  levalbuterol Inhalation 0.63 milliGRAM(s) Inhalation every 6 hours PRN shortness of breath and/or wheezing  melatonin 3 milliGRAM(s) Oral at bedtime PRN Insomnia  ondansetron Injectable 4 milliGRAM(s) IV Push every 8 hours PRN Nausea and/or Vomiting  Vitals:  T(F): 98 (08-07-23 @ 11:19), Max: 99.1 (08-06-23 @ 12:15)  HR: 103 (08-07-23 @ 11:19) (75 - 103)  BP: 145/84 (08-07-23 @ 11:19) (133/79 - 187/97)  RR: 17 (08-07-23 @ 11:19) (17 - 18)  SpO2: 95% (08-07-23 @ 11:19) (93% - 96%)    08-06 @ 07:01  -  08-07 @ 07:00  --------------------------------------------------------  IN:    Oral Fluid: 1000 mL  Total IN: 1000 mL    OUT:    Voided (mL): 1350 mL  Total OUT: 1350 mL    Total NET: -350 mL    Weight (kg): 52.8 (08-05 @ 07:11)  BMI (kg/m2): 21.3 (08-05 @ 07:11)    PHYSICAL EXAM:  Neuro: Awake, responsive  CV: S1 S2 RRR + SM  Lungs: CTABL  GI: Soft, BS +, ND, NT  Extremities: No edema    TELEMETRY: sinus    RADIOLOGY:   < from: Xray Chest 1 View- PORTABLE-Urgent (Xray Chest 1 View- PORTABLE-Urgent .) (08.05.23 @ 04:22) >  Cardiomegaly.    < end of copied text >    DIAGNOSTIC TESTING:    [x ] Echocardiogram:   < from: TTE Echo Complete w/o Contrast w/ Doppler (08.05.23 @ 10:57) >  Left Ventricle:  Global LV systolic function was normal. Left ventricular ejection   fraction, by visual estimation, is 50 to 55%. Spectral Doppler shows   pseudonormal pattern of left ventricular myocardial filling (Grade II   diastolic dysfunction). Global longitudinal strain using Meghan Epic CVx   software is -11.2% at a HR of 70bpm and /71.  Right Ventricle: The right ventricle was not well visualized.  Left Atrium: Mildly enlarged left atrium.  Right Atrium: Mildly enlarged right atrium.  Pericardium: The pericardium was not well visualized.  Mitral Valve: Mild thickening of the anterior and posterior mitral valve   leaflets. There is moderate mitral annular calcification. Mild mitral   valve regurgitation is seen.  Tricuspid Valve: The tricuspid valve is not well seen. Moderate tricuspid   regurgitation is visualized.  Aortic Valve: Mild aortic valve regurgitation is seen.  Pulmonic Valve: The pulmonic valve was not well visualized.  Aorta: Aortic root measured at Sinus of Valsalva is normal.  Pulmonary Artery: The pulmonary artery is not well seen.      Summary:   1. Left ventricular ejection fraction, by visual estimation, is 50 to   55%.   2. Technically limited study.   3. Normal global left ventricular systolic function.   4. Spectral Doppler shows pseudonormal pattern of left ventricular   myocardial filling (Grade II diastolic dysfunction).   5. Global longitudinal strain using Meghan Epic CVx software is -11.2%   at a HR of 70bpm and /71.   6. Mildly enlarged left atrium.   7. Mildly enlarged right atrium.   8. Mild mitral valve regurgitation.   9. Mild thickening of the anterior and posterior mitral valve leaflets.  10. Moderate mitral annular calcification.  11. Moderate tricuspid regurgitation.  12. Mild aortic regurgitation.    < end of copied text >    LABS:	 	    CARDIAC MARKERS:  Troponin I, High Sensitivity Result: 98.7 ng/L (08-07 @ 07:00)  Troponin I, High Sensitivity Result: 445.5 ng/L (08-05 @ 14:47)  Troponin I, High Sensitivity Result: 152.1 ng/L (08-05 @ 05:02)  Troponin I, High Sensitivity Result: 28.4 ng/L (08-05 @ 02:43)    07 Aug 2023 07:00    140    |  103    |  28     ----------------------------<  140    3.1     |  32     |  0.88   05 Aug 2023 02:43    140    |  109    |  25     ----------------------------<  128    4.0     |  26     |  0.88     Ca    9.1        07 Aug 2023 07:00                        14.1   8.52  )-----------( 275      ( 07 Aug 2023 07:00 )             43.8 ,                       12.9   9.61  )-----------( 239      ( 05 Aug 2023 02:43 )             40.3     Lipid Profile: 08-05 @ 14:47  HDL/Total Cholesterol: --  HDL Chol:              79 mg/dL  Serum Chol:            192 mg/dL  Direct LDL:            --  Triglycerides:         147 mg/dL    TSH: Thyroid Stimulating Hormone, Serum: 2.260 uIU/mL (08-05 @ 05:02)    INR: 0.95 ratio (08-05 @ 02:43)

## 2023-08-07 NOTE — CHART NOTE - NSCHARTNOTEFT_GEN_A_CORE
seen and examined  feels better  echo pending  cardiology to see patient    Vital Signs Last 24 Hrs  T(C): 36.4 (05 Aug 2023 07:11), Max: 36.8 (05 Aug 2023 05:12)  T(F): 97.6 (05 Aug 2023 07:11), Max: 98.2 (05 Aug 2023 05:12)  HR: 87 (05 Aug 2023 07:11) (69 - 166)  BP: 142/94 (05 Aug 2023 07:11) (121/75 - 160/71)  BP(mean): --  RR: 18 (05 Aug 2023 07:11) (18 - 30)  SpO2: 100% (05 Aug 2023 07:11) (94% - 100%)    Parameters below as of 05 Aug 2023 07:11  Patient On (Oxygen Delivery Method): room air                          12.9   9.61  )-----------( 239      ( 05 Aug 2023 02:43 )             40.3     08-05    140  |  109<H>  |  25<H>  ----------------------------<  128<H>  4.0   |  26  |  0.88    Ca    8.6      05 Aug 2023 02:43  Mg     2.1     08-05    TPro  6.7  /  Alb  3.1<L>  /  TBili  0.4  /  DBili  x   /  AST  46<H>  /  ALT  40  /  AlkPhos  74  08-05    PT/INR - ( 05 Aug 2023 02:43 )   PT: 11.3 sec;   INR: 0.95 ratio         PTT - ( 05 Aug 2023 02:43 )  PTT:30.0 sec  Urinalysis Basic - ( 05 Aug 2023 02:43 )    Color: x / Appearance: x / SG: x / pH: x  Gluc: 128 mg/dL / Ketone: x  / Bili: x / Urobili: x   Blood: x / Protein: x / Nitrite: x   Leuk Esterase: x / RBC: x / WBC x   Sq Epi: x / Non Sq Epi: x / Bacteria: x
Called by RN for patient's HR sustaining at 171bpm. Patient seen at the bedside. Denies CP or palpitations. States that she's feeling anxious since they switched her from door to window. Chart reviewed. 89y Female, a resident of an assisted living facility with significant PMH of HTN and HPL was BIBA with c/o chest pressure and elevated HR and BP for further evaluation.  Upon arrival in ED, she was found to be in SVT with HR of 162 bpm.     Repeat /72 and HR down to 91  -No intervention for now as patient converted to NSR.   -Patient placed by the door  -Will continue to monitor

## 2023-08-07 NOTE — PROGRESS NOTE ADULT - ASSESSMENT
89F HTN, HLD who experienced an episode of diaphoresis and chest pressure, found to be in SVT that broke with Cardizem.    Troponin mildly elevated, now downtrending, possibly demand ischemia   -TTE with Ef 50-55%, Mod TR, mild MR/AR  -c/o chest pressure this am after breakfast, repeat EKG with no acute changes  -HR 90 -100s with SBP ~ 160s, would increase Toprol to 50 mg daily   -will add PPI   -BP control - on losartan  -TSH wnl  -nearing euvolemic, will switch IV Lasix to oral, low dose 89F HTN, HLD who experienced an episode of diaphoresis and chest pressure, found to be in SVT that broke with Cardizem.    Troponin mildly elevated, now downtrending, possibly demand ischemia   -TTE with Ef 50-55%, Mod TR, mild MR/AR  -c/o chest pressure this am after breakfast, repeat EKG with no acute changes  -HR 90 -100s with SBP ~ 160s, one episode of SVT now, ?AVNRT, would increase Toprol to 50 mg daily   -will add PPI   -BP control - on losartan  -TSH wnl  -nearing euvolemic, will switch IV Lasix to oral, low dose

## 2023-08-08 LAB
ANION GAP SERPL CALC-SCNC: 3 MMOL/L — LOW (ref 5–17)
BUN SERPL-MCNC: 29 MG/DL — HIGH (ref 7–23)
CALCIUM SERPL-MCNC: 9.1 MG/DL — SIGNIFICANT CHANGE UP (ref 8.5–10.1)
CHLORIDE SERPL-SCNC: 105 MMOL/L — SIGNIFICANT CHANGE UP (ref 96–108)
CO2 SERPL-SCNC: 30 MMOL/L — SIGNIFICANT CHANGE UP (ref 22–31)
CREAT SERPL-MCNC: 1.23 MG/DL — SIGNIFICANT CHANGE UP (ref 0.5–1.3)
EGFR: 41 ML/MIN/1.73M2 — LOW
GLUCOSE SERPL-MCNC: 124 MG/DL — HIGH (ref 70–99)
POTASSIUM SERPL-MCNC: 5.7 MMOL/L — HIGH (ref 3.5–5.3)
POTASSIUM SERPL-MCNC: 6.3 MMOL/L — CRITICAL HIGH (ref 3.5–5.3)
POTASSIUM SERPL-SCNC: 5.7 MMOL/L — HIGH (ref 3.5–5.3)
POTASSIUM SERPL-SCNC: 6.3 MMOL/L — CRITICAL HIGH (ref 3.5–5.3)
SODIUM SERPL-SCNC: 138 MMOL/L — SIGNIFICANT CHANGE UP (ref 135–145)

## 2023-08-08 PROCEDURE — 99232 SBSQ HOSP IP/OBS MODERATE 35: CPT

## 2023-08-08 PROCEDURE — 93010 ELECTROCARDIOGRAM REPORT: CPT

## 2023-08-08 RX ORDER — DILTIAZEM HCL 120 MG
30 CAPSULE, EXT RELEASE 24 HR ORAL
Refills: 0 | Status: DISCONTINUED | OUTPATIENT
Start: 2023-08-08 | End: 2023-08-08

## 2023-08-08 RX ORDER — SODIUM ZIRCONIUM CYCLOSILICATE 10 G/10G
5 POWDER, FOR SUSPENSION ORAL ONCE
Refills: 0 | Status: COMPLETED | OUTPATIENT
Start: 2023-08-08 | End: 2023-08-08

## 2023-08-08 RX ORDER — DILTIAZEM HCL 120 MG
10 CAPSULE, EXT RELEASE 24 HR ORAL ONCE
Refills: 0 | Status: COMPLETED | OUTPATIENT
Start: 2023-08-08 | End: 2023-08-08

## 2023-08-08 RX ORDER — CALCIUM GLUCONATE 100 MG/ML
1 VIAL (ML) INTRAVENOUS ONCE
Refills: 0 | Status: COMPLETED | OUTPATIENT
Start: 2023-08-08 | End: 2023-08-08

## 2023-08-08 RX ADMIN — PANTOPRAZOLE SODIUM 40 MILLIGRAM(S): 20 TABLET, DELAYED RELEASE ORAL at 06:11

## 2023-08-08 RX ADMIN — LOSARTAN POTASSIUM 50 MILLIGRAM(S): 100 TABLET, FILM COATED ORAL at 05:43

## 2023-08-08 RX ADMIN — ATORVASTATIN CALCIUM 20 MILLIGRAM(S): 80 TABLET, FILM COATED ORAL at 21:53

## 2023-08-08 RX ADMIN — LEVALBUTEROL 0.63 MILLIGRAM(S): 1.25 SOLUTION, CONCENTRATE RESPIRATORY (INHALATION) at 20:48

## 2023-08-08 RX ADMIN — ENOXAPARIN SODIUM 40 MILLIGRAM(S): 100 INJECTION SUBCUTANEOUS at 05:44

## 2023-08-08 RX ADMIN — SODIUM ZIRCONIUM CYCLOSILICATE 5 GRAM(S): 10 POWDER, FOR SUSPENSION ORAL at 20:00

## 2023-08-08 RX ADMIN — Medication 10 MILLIGRAM(S): at 09:36

## 2023-08-08 RX ADMIN — Medication 20 MILLIGRAM(S): at 06:50

## 2023-08-08 RX ADMIN — Medication 100 GRAM(S): at 11:59

## 2023-08-08 RX ADMIN — Medication 50 MILLIGRAM(S): at 06:50

## 2023-08-08 RX ADMIN — LEVALBUTEROL 0.63 MILLIGRAM(S): 1.25 SOLUTION, CONCENTRATE RESPIRATORY (INHALATION) at 05:21

## 2023-08-08 NOTE — PROGRESS NOTE ADULT - ASSESSMENT
89F HTN, HLD who experienced an episode of diaphoresis and chest pressure, found to be in SVT that broke with Cardizem.  Troponin mildly elevated, now downtrending, possibly demand ischemia     SVT again this am, 160s with SBP in 90s, pt c/o "Not feeling good", responded to Cardizem 10 mg IV x1  Now remains in junctional rhythm 50s    -cont on telemetry   -TTE with Ef 50-55%, Mod TR, mild MR/AR  -cont bbl with holding parameters, currently on Toprol 50 daily   -No further chest pressure or substernal discomfort, cont PPI   -if BP remains low, would hold Losartan   -TSH wnl  -on Lasix 20 po daily, monitor renal function and electrolytes  -K 6.3, hyperkalemia management, repeat K  -probable EP eval

## 2023-08-08 NOTE — PROGRESS NOTE ADULT - ASSESSMENT
The patient is a 89y Female, a resident of an assisted living facility with significant PMH of HTN and HPL was BIBA with c/o chest pressure and elevated HR and BP for further evaluation.  Found to be in SVT.      # SVT - pt with recurrent episodes.  TTE showed EF 50-55%. Cardiology following.  Continue rate control.  Possible role for EP.   # Elevated Troponin - slight elevated trending upward.  Pt without symtpoms.  Suspect demand ischemia.  Troponin now improved.  Cardiology following  # HTN and HPL - Stable and controlled.Continue her Lipitor.  # DVT prophylaxis: Lovenox sq daily.    Plan of care d/w granddaughter  on 8/6 and 8/7/2023

## 2023-08-08 NOTE — PROGRESS NOTE ADULT - SUBJECTIVE AND OBJECTIVE BOX
Patient: MISAEL STACK [id number] 89y Female                            Hospitalist Attending Note    Another episode of SVT noted.    Symptoms have resolved.    ____________________PHYSICAL EXAM:  GENERAL:  NAD Alert and Oriented x 3 Pleasant.   HEENT: NCAT  CARDIOVASCULAR:  S1, S2  LUNGS: CTAB  ABDOMEN:  soft, (-) tenderness, (-) distension, (+) bowel sounds, (-) guarding, (-) rebound (-) rigidity  EXTREMITIES:  no cyanosis / clubbing / edema.   ____________________    VITALS:  Vital Signs Last 24 Hrs  T(C): 37.1 (08 Aug 2023 16:33), Max: 37.8 (08 Aug 2023 05:04)  T(F): 98.7 (08 Aug 2023 16:33), Max: 100.1 (08 Aug 2023 05:04)  HR: 81 (08 Aug 2023 16:33) (56 - 86)  BP: 134/86 (08 Aug 2023 16:33) (109/66 - 134/86)  BP(mean): --  RR: 19 (08 Aug 2023 16:33) (19 - 21)  SpO2: 97% (08 Aug 2023 16:33) (95% - 99%)    Parameters below as of 08 Aug 2023 17:15  Patient On (Oxygen Delivery Method): nasal cannula     Daily     Daily   CAPILLARY BLOOD GLUCOSE        I&O's Summary    07 Aug 2023 07:01  -  08 Aug 2023 07:00  --------------------------------------------------------  IN: 300 mL / OUT: 200 mL / NET: 100 mL        LABS:                        14.1   8.52  )-----------( 275      ( 07 Aug 2023 07:00 )             43.8     08-08    x   |  x   |  x   ----------------------------<  x   5.7<H>   |  x   |  x     Ca    9.1      08 Aug 2023 06:45          Urinalysis Basic - ( 08 Aug 2023 06:45 )    Color: x / Appearance: x / SG: x / pH: x  Gluc: 124 mg/dL / Ketone: x  / Bili: x / Urobili: x   Blood: x / Protein: x / Nitrite: x   Leuk Esterase: x / RBC: x / WBC x   Sq Epi: x / Non Sq Epi: x / Bacteria: x      CARDIAC MARKERS ( 07 Aug 2023 07:00 )  x     / x     / 79 U/L / x     / 2.1 ng/mL          MEDICATIONS:  acetaminophen     Tablet .. 650 milliGRAM(s) Oral every 6 hours PRN  aluminum hydroxide/magnesium hydroxide/simethicone Suspension 30 milliLiter(s) Oral every 4 hours PRN  atorvastatin 20 milliGRAM(s) Oral at bedtime  enoxaparin Injectable 40 milliGRAM(s) SubCutaneous every 24 hours  furosemide    Tablet 20 milliGRAM(s) Oral daily  levalbuterol Inhalation 0.63 milliGRAM(s) Inhalation every 6 hours PRN  losartan 50 milliGRAM(s) Oral daily  melatonin 3 milliGRAM(s) Oral at bedtime PRN  metoprolol succinate ER 50 milliGRAM(s) Oral daily  ondansetron Injectable 8 milliGRAM(s) IV Push every 6 hours PRN  pantoprazole    Tablet 40 milliGRAM(s) Oral before breakfast

## 2023-08-08 NOTE — PROGRESS NOTE ADULT - SUBJECTIVE AND OBJECTIVE BOX
Patient is a 89y old  Female who presents with SVT (07 Aug 2023 18:46)    PAST MEDICAL & SURGICAL HISTORY:  HTN (hypertension)    Hyperlipidemia    S/P lumpectomy, left breast    INTERVAL HISTORY: c/o not feeling good while sitting up in chair this am for breakfast, found to be in SVT with SBP in 90s, denies any chest pain or sob   	  MEDICATIONS:  MEDICATIONS  (STANDING):  atorvastatin 20 milliGRAM(s) Oral at bedtime  calcium gluconate IVPB 1 Gram(s) IV Intermittent once  enoxaparin Injectable 40 milliGRAM(s) SubCutaneous every 24 hours  furosemide    Tablet 20 milliGRAM(s) Oral daily  losartan 50 milliGRAM(s) Oral daily  metoprolol succinate ER 50 milliGRAM(s) Oral daily  pantoprazole    Tablet 40 milliGRAM(s) Oral before breakfast    MEDICATIONS  (PRN):  acetaminophen     Tablet .. 650 milliGRAM(s) Oral every 6 hours PRN Temp greater or equal to 38C (100.4F), Mild Pain (1 - 3)  aluminum hydroxide/magnesium hydroxide/simethicone Suspension 30 milliLiter(s) Oral every 4 hours PRN Dyspepsia  levalbuterol Inhalation 0.63 milliGRAM(s) Inhalation every 6 hours PRN shortness of breath and/or wheezing  melatonin 3 milliGRAM(s) Oral at bedtime PRN Insomnia  ondansetron Injectable 8 milliGRAM(s) IV Push every 6 hours PRN Nausea and/or Vomiting    Vitals:  T(F): 99.1 (08-08-23 @ 11:00), Max: 100.1 (08-08-23 @ 05:04)  HR: 56 (08-08-23 @ 11:00) (56 - 116)  BP: 109/66 (08-08-23 @ 11:00) (109/66 - 180/72)  RR: 20 (08-08-23 @ 11:00) (16 - 21)  SpO2: 97% (08-08-23 @ 11:00) (91% - 99%)    08-07 @ 07:01  -  08-08 @ 07:00  --------------------------------------------------------  IN:    Oral Fluid: 300 mL  Total IN: 300 mL    OUT:    Voided (mL): 200 mL  Total OUT: 200 mL    Total NET: 100 mL    PHYSICAL EXAM:  Neuro: Awake, responsive  CV: S1 S2 RRR + SM  Lungs: CTABL  GI: Soft, BS +, ND, NT  Extremities: No edema    TELEMETRY: sinus/SVT/ junctional     RADIOLOGY: < from: Xray Chest 1 View- PORTABLE-Urgent (Xray Chest 1 View- PORTABLE-Urgent .) (08.05.23 @ 04:22) >  No focal lung consolidation or sizable pleural effusion.   No pneumothorax.    Degenerative changes of the spine and right shoulder. Old right rib   fracture deformities..    IMPRESSION:    Cardiomegaly.    < end of copied text >    DIAGNOSTIC TESTING:    [x ] Echocardiogram:   < from: TTE Echo Complete w/o Contrast w/ Doppler (08.05.23 @ 10:57) >  Left Ventricle:  Global LV systolic function was normal. Left ventricular ejection   fraction, by visual estimation, is 50 to 55%. Spectral Doppler shows   pseudonormal pattern of left ventricular myocardial filling (Grade II   diastolic dysfunction). Global longitudinal strain using Meghan Epic CVx   software is -11.2% at a HR of 70bpm and /71.  Right Ventricle: The right ventricle was not well visualized.  Left Atrium: Mildly enlarged left atrium.  Right Atrium: Mildly enlarged right atrium.  Pericardium: The pericardium was not well visualized.  Mitral Valve: Mild thickening of the anterior and posterior mitral valve   leaflets. There is moderate mitral annular calcification. Mild mitral   valve regurgitation is seen.  Tricuspid Valve: The tricuspid valve is not well seen. Moderate tricuspid   regurgitation is visualized.  Aortic Valve: Mild aortic valve regurgitation is seen.  Pulmonic Valve: The pulmonic valve was not well visualized.  Aorta: Aortic root measured atSinus of Valsalva is normal.  Pulmonary Artery: The pulmonary artery is not well seen.    Summary:   1. Left ventricular ejection fraction, by visual estimation, is 50 to   55%.   2. Technically limited study.   3. Normal global left ventricular systolic function.   4. Spectral Doppler shows pseudonormal pattern of left ventricular   myocardial filling (Grade II diastolic dysfunction).   5. Global longitudinal strain using Meghan Epic CVx software is -11.2%   at a HR of 70bpm and /71.   6. Mildly enlarged left atrium.   7. Mildly enlarged right atrium.   8. Mild mitral valve regurgitation.   9. Mild thickening of the anterior and posterior mitral valve leaflets.  10. Moderate mitral annular calcification.  11. Moderate tricuspid regurgitation.  12. Mild aortic regurgitation.    < end of copied text >    LABS:	 	    CARDIAC MARKERS:  Troponin I, High Sensitivity Result: 98.7 ng/L (08-07 @ 07:00)  Troponin I, High Sensitivity Result: 445.5 ng/L (08-05 @ 14:47)    08 Aug 2023 10:45    x      |  x      |  x      ----------------------------<  x      5.7     |  x      |  x      08 Aug 2023 06:45    138    |  105    |  29     ----------------------------<  124    6.3     |  30     |  1.23   07 Aug 2023 07:00    140    |  103    |  28     ----------------------------<  140    3.1     |  32     |  0.88     Ca    9.1        08 Aug 2023 06:45                        14.1   8.52  )-----------( 275      ( 07 Aug 2023 07:00 )             43.8     Lipid Profile: 08-05 @ 14:47  HDL/Total Cholesterol: --  HDL Chol:              79 mg/dL  Serum Chol:            192 mg/dL  Direct LDL:            --  Triglycerides:         147 mg/dL    TSH: Thyroid Stimulating Hormone, Serum: 2.260 uIU/mL (08-05 @ 05:02)

## 2023-08-09 LAB
ANION GAP SERPL CALC-SCNC: 4 MMOL/L — LOW (ref 5–17)
BUN SERPL-MCNC: 33 MG/DL — HIGH (ref 7–23)
CALCIUM SERPL-MCNC: 8.6 MG/DL — SIGNIFICANT CHANGE UP (ref 8.5–10.1)
CHLORIDE SERPL-SCNC: 103 MMOL/L — SIGNIFICANT CHANGE UP (ref 96–108)
CO2 SERPL-SCNC: 30 MMOL/L — SIGNIFICANT CHANGE UP (ref 22–31)
CREAT SERPL-MCNC: 1.31 MG/DL — HIGH (ref 0.5–1.3)
EGFR: 38 ML/MIN/1.73M2 — LOW
GLUCOSE SERPL-MCNC: 119 MG/DL — HIGH (ref 70–99)
HCT VFR BLD CALC: 42.4 % — SIGNIFICANT CHANGE UP (ref 34.5–45)
HGB BLD-MCNC: 13.7 G/DL — SIGNIFICANT CHANGE UP (ref 11.5–15.5)
MCHC RBC-ENTMCNC: 30 PG — SIGNIFICANT CHANGE UP (ref 27–34)
MCHC RBC-ENTMCNC: 32.3 G/DL — SIGNIFICANT CHANGE UP (ref 32–36)
MCV RBC AUTO: 93 FL — SIGNIFICANT CHANGE UP (ref 80–100)
NRBC # BLD: 0 /100 WBCS — SIGNIFICANT CHANGE UP (ref 0–0)
PLATELET # BLD AUTO: 249 K/UL — SIGNIFICANT CHANGE UP (ref 150–400)
POTASSIUM SERPL-MCNC: 4 MMOL/L — SIGNIFICANT CHANGE UP (ref 3.5–5.3)
POTASSIUM SERPL-SCNC: 4 MMOL/L — SIGNIFICANT CHANGE UP (ref 3.5–5.3)
RBC # BLD: 4.56 M/UL — SIGNIFICANT CHANGE UP (ref 3.8–5.2)
RBC # FLD: 14.6 % — HIGH (ref 10.3–14.5)
SODIUM SERPL-SCNC: 137 MMOL/L — SIGNIFICANT CHANGE UP (ref 135–145)
WBC # BLD: 7.93 K/UL — SIGNIFICANT CHANGE UP (ref 3.8–10.5)
WBC # FLD AUTO: 7.93 K/UL — SIGNIFICANT CHANGE UP (ref 3.8–10.5)

## 2023-08-09 PROCEDURE — 99232 SBSQ HOSP IP/OBS MODERATE 35: CPT

## 2023-08-09 PROCEDURE — 99232 SBSQ HOSP IP/OBS MODERATE 35: CPT | Mod: FS

## 2023-08-09 RX ORDER — LOSARTAN POTASSIUM 100 MG/1
25 TABLET, FILM COATED ORAL DAILY
Refills: 0 | Status: DISCONTINUED | OUTPATIENT
Start: 2023-08-10 | End: 2023-08-10

## 2023-08-09 RX ORDER — METOPROLOL TARTRATE 50 MG
50 TABLET ORAL DAILY
Refills: 0 | Status: DISCONTINUED | OUTPATIENT
Start: 2023-08-10 | End: 2023-08-10

## 2023-08-09 RX ORDER — DILTIAZEM HCL 120 MG
30 CAPSULE, EXT RELEASE 24 HR ORAL EVERY 8 HOURS
Refills: 0 | Status: DISCONTINUED | OUTPATIENT
Start: 2023-08-09 | End: 2023-08-10

## 2023-08-09 RX ADMIN — Medication 20 MILLIGRAM(S): at 05:36

## 2023-08-09 RX ADMIN — Medication 50 MILLIGRAM(S): at 05:36

## 2023-08-09 RX ADMIN — PANTOPRAZOLE SODIUM 40 MILLIGRAM(S): 20 TABLET, DELAYED RELEASE ORAL at 06:21

## 2023-08-09 RX ADMIN — Medication 30 MILLIGRAM(S): at 21:30

## 2023-08-09 RX ADMIN — Medication 30 MILLIGRAM(S): at 12:08

## 2023-08-09 RX ADMIN — ENOXAPARIN SODIUM 40 MILLIGRAM(S): 100 INJECTION SUBCUTANEOUS at 05:35

## 2023-08-09 RX ADMIN — ATORVASTATIN CALCIUM 20 MILLIGRAM(S): 80 TABLET, FILM COATED ORAL at 21:30

## 2023-08-09 RX ADMIN — LOSARTAN POTASSIUM 50 MILLIGRAM(S): 100 TABLET, FILM COATED ORAL at 05:36

## 2023-08-09 NOTE — PROGRESS NOTE ADULT - SUBJECTIVE AND OBJECTIVE BOX
Patient is a 89y old  Female who presents with SVT (08 Aug 2023 18:31)    PAST MEDICAL & SURGICAL HISTORY:  HTN (hypertension)    Hyperlipidemia    S/P lumpectomy, left breast    INTERVAL HISTORY: resting in chair, in no distress  	  MEDICATIONS:  MEDICATIONS  (STANDING):  atorvastatin 20 milliGRAM(s) Oral at bedtime  diltiazem    Tablet 30 milliGRAM(s) Oral every 8 hours  enoxaparin Injectable 40 milliGRAM(s) SubCutaneous every 24 hours  furosemide    Tablet 20 milliGRAM(s) Oral daily  losartan 50 milliGRAM(s) Oral daily  metoprolol succinate ER 50 milliGRAM(s) Oral daily  pantoprazole    Tablet 40 milliGRAM(s) Oral before breakfast    MEDICATIONS  (PRN):  acetaminophen     Tablet .. 650 milliGRAM(s) Oral every 6 hours PRN Temp greater or equal to 38C (100.4F), Mild Pain (1 - 3)  aluminum hydroxide/magnesium hydroxide/simethicone Suspension 30 milliLiter(s) Oral every 4 hours PRN Dyspepsia  levalbuterol Inhalation 0.63 milliGRAM(s) Inhalation every 6 hours PRN shortness of breath and/or wheezing  melatonin 3 milliGRAM(s) Oral at bedtime PRN Insomnia  ondansetron Injectable 8 milliGRAM(s) IV Push every 6 hours PRN Nausea and/or Vomiting    Vitals:  T(F): 98.5 (08-09-23 @ 10:42), Max: 99.3 (08-09-23 @ 04:52)  HR: 82 (08-09-23 @ 10:42) (75 - 82)  BP: 106/69 (08-09-23 @ 10:42) (106/69 - 150/88)  RR: 19 (08-09-23 @ 10:42) (18 - 20)  SpO2: 97% (08-09-23 @ 10:42) (97% - 99%)    08-09 @ 07:01  -  08-09 @ 12:34  --------------------------------------------------------  IN:    Oral Fluid: 118 mL  Total IN: 118 mL    OUT:    Voided (mL): 900 mL  Total OUT: 900 mL    Total NET: -782 mL    PHYSICAL EXAM:  Neuro: Awake, responsive  CV: S1 S2 RRR + SM  Lungs: CTABL  GI: Soft, BS +, ND, NT  Extremities: No edema    TELEMETRY: sinus    RADIOLOGY: < from: Xray Chest 1 View- PORTABLE-Urgent (Xray Chest 1 View- PORTABLE-Urgent .) (08.05.23 @ 04:22) >  Cardiomegaly.    < end of copied text >    DIAGNOSTIC TESTING:    [x ] Echocardiogram:  < from: TTE Echo Complete w/o Contrast w/ Doppler (08.05.23 @ 10:57) >  Left Ventricle:  Global LV systolic function was normal. Left ventricular ejection   fraction, by visual estimation, is 50 to 55%. Spectral Doppler shows   pseudonormal pattern of left ventricular myocardial filling (Grade II   diastolic dysfunction). Global longitudinal strain using Meghan Epic CVx   software is -11.2% at a HR of 70bpm and /71.  Right Ventricle: The right ventricle was not well visualized.  Left Atrium: Mildly enlarged left atrium.  Right Atrium: Mildly enlarged right atrium.  Pericardium: The pericardium was not well visualized.  Mitral Valve: Mild thickening of the anterior and posterior mitral valve   leaflets. There is moderate mitral annular calcification. Mild mitral   valve regurgitation is seen.  Tricuspid Valve: The tricuspid valve is not well seen. Moderate tricuspid   regurgitation is visualized.  Aortic Valve: Mild aortic valve regurgitation is seen.  Pulmonic Valve: The pulmonic valve was not well visualized.  Aorta: Aortic root measured atSinus of Valsalva is normal.  Pulmonary Artery: The pulmonary artery is not well seen.      Summary:   1. Left ventricular ejection fraction, by visual estimation, is 50 to   55%.   2. Technically limited study.   3. Normal global left ventricular systolic function.   4. Spectral Doppler shows pseudonormal pattern of left ventricular   myocardial filling (Grade II diastolic dysfunction).   5. Global longitudinal strain using Meghan Epic CVx software is -11.2%   at a HR of 70bpm and /71.   6. Mildly enlarged left atrium.   7. Mildly enlarged right atrium.   8. Mild mitral valve regurgitation.   9. Mild thickening of the anterior and posterior mitral valve leaflets.  10. Moderate mitral annular calcification.  11. Moderate tricuspid regurgitation.  12. Mild aortic regurgitation.    < end of copied text >    LABS:	 	    CARDIAC MARKERS:  Troponin I, High Sensitivity Result: 98.7 ng/L (08-07 @ 07:00)    08 Aug 2023 10:45    x      |  x      |  x      ----------------------------<  x      5.7     |  x      |  x      08 Aug 2023 06:45    138    |  105    |  29     ----------------------------<  124    6.3     |  30     |  1.23   07 Aug 2023 07:00    140    |  103    |  28     ----------------------------<  140    3.1     |  32     |  0.88     Ca    9.1        08 Aug 2023 06:45                        14.1   8.52  )-----------( 275      ( 07 Aug 2023 07:00 )             43.8   Lipid Profile: 08-05 @ 14:47  HDL/Total Cholesterol: --  HDL Chol:              79 mg/dL  Serum Chol:            192 mg/dL  Direct LDL:            --  Triglycerides:         147 mg/dL

## 2023-08-09 NOTE — PROGRESS NOTE ADULT - ASSESSMENT
The patient is a 89y Female, a resident of an assisted living facility with significant PMH of HTN and HPL was BIBA with c/o chest pressure and elevated HR and BP for further evaluation.  Found to be in SVT.      # SVT - TTE showed EF 50-55%. Cardiology following.  Continue rate control.  No further tachycardic episodes. .   # Elevated Troponin - slight elevated trending upward.  Pt without symtpoms.  Suspect demand ischemia.  Troponin now improved.  Cardiology following  # HTN and HPL - Stable and controlled.Continue her Lipitor.  # DVT prophylaxis: Lovenox sq daily.    Plan of care d/w granddaughter  on 8/6 and 8/7/2023  Possible d/c in 24h.

## 2023-08-09 NOTE — PROGRESS NOTE ADULT - SUBJECTIVE AND OBJECTIVE BOX
Patient: MISAEL STACK [id number] 89y Female                            Hospitalist Attending Note    No complaints.  No chest pain / palpitations.  No SOB.    ____________________PHYSICAL EXAM:  GENERAL:  NAD Alert and Oriented x 3 Pleasant.   HEENT: NCAT  CARDIOVASCULAR:  S1, S2  LUNGS: CTAB  ABDOMEN:  soft, (-) tenderness, (-) distension, (+) bowel sounds, (-) guarding, (-) rebound (-) rigidity  EXTREMITIES:  no cyanosis / clubbing / edema.   ____________________    VITALS:  Vital Signs Last 24 Hrs  T(C): 36.9 (09 Aug 2023 10:42), Max: 37.4 (09 Aug 2023 04:52)  T(F): 98.5 (09 Aug 2023 10:42), Max: 99.3 (09 Aug 2023 04:52)  HR: 89 (09 Aug 2023 15:15) (75 - 89)  BP: 131/82 (09 Aug 2023 15:15) (106/69 - 150/88)  BP(mean): --  RR: 18 (09 Aug 2023 15:15) (18 - 20)  SpO2: 96% (09 Aug 2023 16:43) (90% - 99%)    Parameters below as of 09 Aug 2023 16:43  Patient On (Oxygen Delivery Method): nasal cannula  O2 Flow (L/min): 2   Daily     Daily   CAPILLARY BLOOD GLUCOSE        I&O's Summary    09 Aug 2023 07:01  -  09 Aug 2023 17:19  --------------------------------------------------------  IN: 355 mL / OUT: 901 mL / NET: -546 mL        LABS:                        13.7   7.93  )-----------( 249      ( 09 Aug 2023 15:10 )             42.4     08-09    137  |  103  |  33<H>  ----------------------------<  119<H>  4.0   |  30  |  1.31<H>    Ca    8.6      09 Aug 2023 15:10          Urinalysis Basic - ( 09 Aug 2023 15:10 )    Color: x / Appearance: x / SG: x / pH: x  Gluc: 119 mg/dL / Ketone: x  / Bili: x / Urobili: x   Blood: x / Protein: x / Nitrite: x   Leuk Esterase: x / RBC: x / WBC x   Sq Epi: x / Non Sq Epi: x / Bacteria: x              MEDICATIONS:  acetaminophen     Tablet .. 650 milliGRAM(s) Oral every 6 hours PRN  aluminum hydroxide/magnesium hydroxide/simethicone Suspension 30 milliLiter(s) Oral every 4 hours PRN  atorvastatin 20 milliGRAM(s) Oral at bedtime  diltiazem    Tablet 30 milliGRAM(s) Oral every 8 hours  enoxaparin Injectable 40 milliGRAM(s) SubCutaneous every 24 hours  furosemide    Tablet 20 milliGRAM(s) Oral daily  levalbuterol Inhalation 0.63 milliGRAM(s) Inhalation every 6 hours PRN  melatonin 3 milliGRAM(s) Oral at bedtime PRN  ondansetron Injectable 8 milliGRAM(s) IV Push every 6 hours PRN  pantoprazole    Tablet 40 milliGRAM(s) Oral before breakfast

## 2023-08-09 NOTE — PROGRESS NOTE ADULT - ASSESSMENT
89F HTN, HLD who experienced an episode of diaphoresis and chest pressure, found to be in SVT that broke with Cardizem.  Troponin mildly elevated, now downtrending, possibly demand ischemia     transient events of SVT on 8/7,  again on 8/8 which responded to Cardizem 10 mg IV x1 -> junctional rhythm 50s -> sinus     -cont on telemetry, currently in sinus 80s, will add low dose Cardizem po and monitor closely for karolina arrhthymias   -TTE with Ef 50-55%, Mod TR, mild MR/AR  -cont bbl with holding parameters, currently on Toprol 50 daily   -No further chest pressure or substernal discomfort, cont PPI   -TSH wnl  -on Lasix 20 po daily, monitor renal function and electrolytes  -s/p hyperkalemia management,  repeat K  -increase activity as tolerated, PT

## 2023-08-10 VITALS — OXYGEN SATURATION: 93 %

## 2023-08-10 LAB
ANION GAP SERPL CALC-SCNC: 6 MMOL/L — SIGNIFICANT CHANGE UP (ref 5–17)
BUN SERPL-MCNC: 36 MG/DL — HIGH (ref 7–23)
CALCIUM SERPL-MCNC: 8.5 MG/DL — SIGNIFICANT CHANGE UP (ref 8.5–10.1)
CHLORIDE SERPL-SCNC: 104 MMOL/L — SIGNIFICANT CHANGE UP (ref 96–108)
CO2 SERPL-SCNC: 31 MMOL/L — SIGNIFICANT CHANGE UP (ref 22–31)
CREAT SERPL-MCNC: 0.94 MG/DL — SIGNIFICANT CHANGE UP (ref 0.5–1.3)
EGFR: 57 ML/MIN/1.73M2 — LOW
GLUCOSE SERPL-MCNC: 104 MG/DL — HIGH (ref 70–99)
POTASSIUM SERPL-MCNC: 3.6 MMOL/L — SIGNIFICANT CHANGE UP (ref 3.5–5.3)
POTASSIUM SERPL-SCNC: 3.6 MMOL/L — SIGNIFICANT CHANGE UP (ref 3.5–5.3)
SODIUM SERPL-SCNC: 141 MMOL/L — SIGNIFICANT CHANGE UP (ref 135–145)

## 2023-08-10 PROCEDURE — 99239 HOSP IP/OBS DSCHRG MGMT >30: CPT

## 2023-08-10 PROCEDURE — 99232 SBSQ HOSP IP/OBS MODERATE 35: CPT

## 2023-08-10 RX ORDER — ATORVASTATIN CALCIUM 80 MG/1
1 TABLET, FILM COATED ORAL
Qty: 30 | Refills: 0
Start: 2023-08-10

## 2023-08-10 RX ORDER — METOPROLOL TARTRATE 50 MG
1 TABLET ORAL
Qty: 30 | Refills: 0
Start: 2023-08-10

## 2023-08-10 RX ORDER — DILTIAZEM HCL 120 MG
1 CAPSULE, EXT RELEASE 24 HR ORAL
Qty: 30 | Refills: 0
Start: 2023-08-10

## 2023-08-10 RX ORDER — FUROSEMIDE 40 MG
1 TABLET ORAL
Qty: 30 | Refills: 0
Start: 2023-08-10

## 2023-08-10 RX ADMIN — Medication 30 MILLIGRAM(S): at 15:11

## 2023-08-10 RX ADMIN — ENOXAPARIN SODIUM 40 MILLIGRAM(S): 100 INJECTION SUBCUTANEOUS at 06:06

## 2023-08-10 RX ADMIN — PANTOPRAZOLE SODIUM 40 MILLIGRAM(S): 20 TABLET, DELAYED RELEASE ORAL at 06:05

## 2023-08-10 RX ADMIN — Medication 20 MILLIGRAM(S): at 06:05

## 2023-08-10 RX ADMIN — Medication 50 MILLIGRAM(S): at 06:04

## 2023-08-10 RX ADMIN — LOSARTAN POTASSIUM 25 MILLIGRAM(S): 100 TABLET, FILM COATED ORAL at 06:06

## 2023-08-10 RX ADMIN — Medication 30 MILLIGRAM(S): at 06:05

## 2023-08-10 NOTE — PROGRESS NOTE ADULT - PROVIDER SPECIALTY LIST ADULT
Cardiology
Hospitalist
Hospitalist
Cardiology
Hospitalist

## 2023-08-10 NOTE — DISCHARGE NOTE PROVIDER - NSDCMRMEDTOKEN_GEN_ALL_CORE_FT
atorvastatin 20 mg oral tablet: 1 tab(s) orally once a day (at bedtime)  calcium-vitamin D 500 mg-5 mcg (200 intl units) oral tablet: 1 tab(s) orally 2 times a day  Cardizem  mg/24 hours oral capsule, extended release: 1 cap(s) orally  furosemide 20 mg oral tablet: 1 tab(s) orally once a day  metoprolol succinate 50 mg oral tablet, extended release: 1 tab(s) orally once a day  Multiple Vitamins oral tablet: 1 tab(s) orally once a day  Tylenol 325 mg oral tablet: 1 tab(s) orally every 6 hours

## 2023-08-10 NOTE — DISCHARGE NOTE PROVIDER - NSDCCPCAREPLAN_GEN_ALL_CORE_FT
PRINCIPAL DISCHARGE DIAGNOSIS  Diagnosis: SVT (supraventricular tachycardia)  Assessment and Plan of Treatment:       SECONDARY DISCHARGE DIAGNOSES  Diagnosis: Hypertension  Assessment and Plan of Treatment:     Diagnosis: Hyperlipidemia  Assessment and Plan of Treatment:     Diagnosis: Elevated troponin  Assessment and Plan of Treatment:

## 2023-08-10 NOTE — DISCHARGE NOTE NURSING/CASE MANAGEMENT/SOCIAL WORK - NSDCPEFALRISK_GEN_ALL_CORE
For information on Fall & Injury Prevention, visit: [email or web address] OR  [email or web address] OR  https://www.cdc.gov/steadi/patient.html

## 2023-08-10 NOTE — PROGRESS NOTE ADULT - NS ATTEND BILL GEN_ALL_CORE
Attending to bill
Xelleilaniz Pregnancy And Lactation Text: This medication is Pregnancy Category D and is not considered safe during pregnancy.  The risk during breast feeding is also uncertain.

## 2023-08-10 NOTE — PROGRESS NOTE ADULT - ASSESSMENT
89F HTN, HLD who experienced an episode of diaphoresis and chest pressure, found to be in SVT that broke with Cardizem.  Troponin mildly elevated, now downtrending, possibly demand ischemia     transient events of SVT on 8/7,  again on 8/8 which responded to Cardizem 10 mg IV x1 -> junctional rhythm 50s -> sinus     -cont on telemetry, currently in sinus 70- 80s, added low dose Cardizem po, monitor closely for karolina arrhthymias, no further SVTs, junctional rhythm or bradycardia noted   -TTE with Ef 50-55%, Mod TR, mild MR/AR  -cont bbl with holding parameters, currently on Toprol 50 daily   -No further chest pressure or substernal discomfort, cont PPI   -TSH wnl  -on Lasix 20 po daily, monitor renal function and electrolytes  -increase activity as tolerated, PT    89F HTN, HLD who experienced an episode of diaphoresis and chest pressure, found to be in SVT that broke with Cardizem.  Troponin mildly elevated, now downtrending, possibly demand ischemia     transient events of SVT on 8/7,  again on 8/8 which responded to Cardizem 10 mg IV x1 -> junctional rhythm 50s -> sinus     -cont on telemetry, currently in sinus 70- 80s, added low dose Cardizem po, monitor closely for karolina arrhthymias, no further SVTs, junctional rhythm or bradycardia noted, switched to cardizem CD upon discharge and may hold losartan   -TTE with Ef 50-55%, Mod TR, mild MR/AR  -cont bbl with holding parameters, currently on Toprol 50 daily   -No further chest pressure or substernal discomfort, cont PPI   -TSH wnl  -on Lasix 20 po daily, monitor renal function and electrolytes  -increase activity as tolerated, PT   -outpatient cardiology follow up within one week of discharge

## 2023-08-10 NOTE — PROGRESS NOTE ADULT - ASSESSMENT
89y Female, a resident of an assisted living facility with significant PMH of HTN and HPL was BIBA with c/o chest pressure and elevated HR and BP for further evaluation.  Found to be in SVT.      # SVT - TTE showed EF 50-55%. Cardiology following.  Continue rate control.  No further tachycardic episodes.  Discharge on Cardizem, Lopressor.    # Elevated Troponin - slight elevated trending upward.  Pt without symptoms.  Not indicative of ACS. Troponin now improved.  Cardiology following  # HTN and HPL - Stable and controlled.  Continue her Lipitor.  # ? hypoxia - pt on O2, however, SaO2 steadily 95% on RA.  Denies SOB.  Stable for d/c home.   # DVT prophylaxis: Lovenox sq daily.    Plan of care d/w granddaughter    Stable for return to half-way.

## 2023-08-10 NOTE — PROGRESS NOTE ADULT - SUBJECTIVE AND OBJECTIVE BOX
Patient is a 89y old  Female who presents with  SVT (09 Aug 2023 17:18)    PAST MEDICAL & SURGICAL HISTORY:  HTN (hypertension)    Hyperlipidemia    S/P lumpectomy, left breast    INTERVAL HISTORY:  	  MEDICATIONS:  MEDICATIONS  (STANDING):  atorvastatin 20 milliGRAM(s) Oral at bedtime  diltiazem    Tablet 30 milliGRAM(s) Oral every 8 hours  enoxaparin Injectable 40 milliGRAM(s) SubCutaneous every 24 hours  furosemide    Tablet 20 milliGRAM(s) Oral daily  losartan 25 milliGRAM(s) Oral daily  metoprolol succinate ER 50 milliGRAM(s) Oral daily  pantoprazole    Tablet 40 milliGRAM(s) Oral before breakfast    MEDICATIONS  (PRN):  acetaminophen     Tablet .. 650 milliGRAM(s) Oral every 6 hours PRN Temp greater or equal to 38C (100.4F), Mild Pain (1 - 3)  aluminum hydroxide/magnesium hydroxide/simethicone Suspension 30 milliLiter(s) Oral every 4 hours PRN Dyspepsia  levalbuterol Inhalation 0.63 milliGRAM(s) Inhalation every 6 hours PRN shortness of breath and/or wheezing  melatonin 3 milliGRAM(s) Oral at bedtime PRN Insomnia  ondansetron Injectable 8 milliGRAM(s) IV Push every 6 hours PRN Nausea and/or Vomiting    Vitals:  T(F): 97.2 (08-10-23 @ 04:45), Max: 98.8 (08-09-23 @ 16:51)  HR: 74 (08-10-23 @ 08:40) (69 - 89)  BP: 122/76 (08-10-23 @ 08:40) (116/70 - 131/82)  RR: 18 (08-10-23 @ 04:45) (17 - 18)  SpO2: 98% (08-10-23 @ 08:40) (90% - 98%)    08-09 @ 07:01  -  08-10 @ 07:00  --------------------------------------------------------  IN:    Oral Fluid: 595 mL  Total IN: 595 mL    OUT:    Voided (mL): 901 mL  Total OUT: 901 mL    Total NET: -306 mL    PHYSICAL EXAM:  Neuro: Awake, responsive  CV: S1 S2 RRR + SM  Lungs: CTABL  GI: Soft, BS +, ND, NT  Extremities: No edema    TELEMETRY: sinus     RADIOLOGY:   < from: Xray Chest 1 View- PORTABLE-Urgent (Xray Chest 1 View- PORTABLE-Urgent .) (08.05.23 @ 04:22) >    Cardiomegaly.    < end of copied text >    DIAGNOSTIC TESTING:    [ x] Echocardiogram: < from: TTE Echo Complete w/o Contrast w/ Doppler (08.05.23 @ 10:57) >  Left Ventricle:  Global LV systolic function was normal. Left ventricular ejection   fraction, by visual estimation, is 50 to 55%. Spectral Doppler shows   pseudonormal pattern of left ventricular myocardial filling (Grade II   diastolic dysfunction). Global longitudinal strain using Meghan Epic CVx   software is -11.2% at a HR of 70bpm and /71.  Right Ventricle: The right ventricle was not well visualized.  Left Atrium: Mildly enlarged left atrium.  Right Atrium: Mildly enlarged right atrium.  Pericardium: The pericardium was not well visualized.  Mitral Valve: Mild thickening of the anterior and posterior mitral valve   leaflets. There is moderate mitral annular calcification. Mild mitral   valve regurgitation is seen.  Tricuspid Valve: The tricuspid valve is not well seen. Moderate tricuspid   regurgitation is visualized.  Aortic Valve: Mild aortic valve regurgitation is seen.  Pulmonic Valve: The pulmonic valve was not well visualized.  Aorta: Aortic root measured atSinus of Valsalva is normal.  Pulmonary Artery: The pulmonary artery is not well seen.      Summary:   1. Left ventricular ejection fraction, by visual estimation, is 50 to   55%.   2. Technically limited study.   3. Normal global left ventricular systolic function.   4. Spectral Doppler shows pseudonormal pattern of left ventricular   myocardial filling (Grade II diastolic dysfunction).   5. Global longitudinal strain using Meghan Epic CVx software is -11.2%   at a HR of 70bpm and /71.   6. Mildly enlarged left atrium.   7. Mildly enlarged right atrium.   8. Mild mitral valve regurgitation.   9. Mild thickening of the anterior and posterior mitral valve leaflets.  10. Moderate mitral annular calcification.  11. Moderate tricuspid regurgitation.  12. Mild aortic regurgitation.    < end of copied text >    LABS:	 	    10 Aug 2023 06:01    141    |  104    |  36     ----------------------------<  104    3.6     |  31     |  0.94   09 Aug 2023 15:10    137    |  103    |  33     ----------------------------<  119    4.0     |  30     |  1.31   08 Aug 2023 10:45    x      |  x      |  x      ----------------------------<  x      5.7     |  x      |  x        Ca    8.5        10 Aug 2023 06:01                        13.7   7.93  )-----------( 249      ( 09 Aug 2023 15:10 )             42.4                  Patient is a 89y old  Female who presents with  SVT (09 Aug 2023 17:18)    PAST MEDICAL & SURGICAL HISTORY:  HTN (hypertension)    Hyperlipidemia    S/P lumpectomy, left breast    INTERVAL HISTORY: resting in bed in no acute distress   	  MEDICATIONS:  MEDICATIONS  (STANDING):  atorvastatin 20 milliGRAM(s) Oral at bedtime  diltiazem    Tablet 30 milliGRAM(s) Oral every 8 hours  enoxaparin Injectable 40 milliGRAM(s) SubCutaneous every 24 hours  furosemide    Tablet 20 milliGRAM(s) Oral daily  losartan 25 milliGRAM(s) Oral daily  metoprolol succinate ER 50 milliGRAM(s) Oral daily  pantoprazole    Tablet 40 milliGRAM(s) Oral before breakfast    MEDICATIONS  (PRN):  acetaminophen     Tablet .. 650 milliGRAM(s) Oral every 6 hours PRN Temp greater or equal to 38C (100.4F), Mild Pain (1 - 3)  aluminum hydroxide/magnesium hydroxide/simethicone Suspension 30 milliLiter(s) Oral every 4 hours PRN Dyspepsia  levalbuterol Inhalation 0.63 milliGRAM(s) Inhalation every 6 hours PRN shortness of breath and/or wheezing  melatonin 3 milliGRAM(s) Oral at bedtime PRN Insomnia  ondansetron Injectable 8 milliGRAM(s) IV Push every 6 hours PRN Nausea and/or Vomiting    Vitals:  T(F): 97.2 (08-10-23 @ 04:45), Max: 98.8 (08-09-23 @ 16:51)  HR: 74 (08-10-23 @ 08:40) (69 - 89)  BP: 122/76 (08-10-23 @ 08:40) (116/70 - 131/82)  RR: 18 (08-10-23 @ 04:45) (17 - 18)  SpO2: 98% (08-10-23 @ 08:40) (90% - 98%)    08-09 @ 07:01  -  08-10 @ 07:00  --------------------------------------------------------  IN:    Oral Fluid: 595 mL  Total IN: 595 mL    OUT:    Voided (mL): 901 mL  Total OUT: 901 mL    Total NET: -306 mL    PHYSICAL EXAM:  Neuro: Awake, responsive  CV: S1 S2 RRR + SM  Lungs: CTABL  GI: Soft, BS +, ND, NT  Extremities: No edema    TELEMETRY: sinus     RADIOLOGY:   < from: Xray Chest 1 View- PORTABLE-Urgent (Xray Chest 1 View- PORTABLE-Urgent .) (08.05.23 @ 04:22) >    Cardiomegaly.    < end of copied text >    DIAGNOSTIC TESTING:    [ x] Echocardiogram: < from: TTE Echo Complete w/o Contrast w/ Doppler (08.05.23 @ 10:57) >  Left Ventricle:  Global LV systolic function was normal. Left ventricular ejection   fraction, by visual estimation, is 50 to 55%. Spectral Doppler shows   pseudonormal pattern of left ventricular myocardial filling (Grade II   diastolic dysfunction). Global longitudinal strain using Meghan Epic CVx   software is -11.2% at a HR of 70bpm and /71.  Right Ventricle: The right ventricle was not well visualized.  Left Atrium: Mildly enlarged left atrium.  Right Atrium: Mildly enlarged right atrium.  Pericardium: The pericardium was not well visualized.  Mitral Valve: Mild thickening of the anterior and posterior mitral valve   leaflets. There is moderate mitral annular calcification. Mild mitral   valve regurgitation is seen.  Tricuspid Valve: The tricuspid valve is not well seen. Moderate tricuspid   regurgitation is visualized.  Aortic Valve: Mild aortic valve regurgitation is seen.  Pulmonic Valve: The pulmonic valve was not well visualized.  Aorta: Aortic root measured at Sinus of Valsalva is normal.  Pulmonary Artery: The pulmonary artery is not well seen.      Summary:   1. Left ventricular ejection fraction, by visual estimation, is 50 to   55%.   2. Technically limited study.   3. Normal global left ventricular systolic function.   4. Spectral Doppler shows pseudonormal pattern of left ventricular   myocardial filling (Grade II diastolic dysfunction).   5. Global longitudinal strain using Meghan Epic CVx software is -11.2%   at a HR of 70bpm and /71.   6. Mildly enlarged left atrium.   7. Mildly enlarged right atrium.   8. Mild mitral valve regurgitation.   9. Mild thickening of the anterior and posterior mitral valve leaflets.  10. Moderate mitral annular calcification.  11. Moderate tricuspid regurgitation.  12. Mild aortic regurgitation.    < end of copied text >    LABS:	 	    10 Aug 2023 06:01    141    |  104    |  36     ----------------------------<  104    3.6     |  31     |  0.94   09 Aug 2023 15:10    137    |  103    |  33     ----------------------------<  119    4.0     |  30     |  1.31   08 Aug 2023 10:45    x      |  x      |  x      ----------------------------<  x      5.7     |  x      |  x        Ca    8.5        10 Aug 2023 06:01                        13.7   7.93  )-----------( 249      ( 09 Aug 2023 15:10 )             42.4

## 2023-08-10 NOTE — PROGRESS NOTE ADULT - SUBJECTIVE AND OBJECTIVE BOX
Patient: MISAEL STACK [id number] 89y Female                            Hospitalist Attending Note    No complaints.  No chest pain / palpitations.  No SOB.  SaO2 95% on RA.     ____________________PHYSICAL EXAM:  GENERAL:  NAD Alert and Oriented to person, place, year.   HEENT: NCAT  CARDIOVASCULAR:  S1, S2  LUNGS: CTAB  ABDOMEN:  soft, (-) tenderness, (-) distension, (+) bowel sounds, (-) guarding, (-) rebound (-) rigidity  EXTREMITIES:  no cyanosis / clubbing / edema.   ____________________    VITALS:  Vital Signs Last 24 Hrs  T(C): 36.2 (10 Aug 2023 11:02), Max: 37.1 (09 Aug 2023 16:51)  T(F): 97.1 (10 Aug 2023 11:02), Max: 98.8 (09 Aug 2023 16:51)  HR: 76 (10 Aug 2023 11:02) (69 - 85)  BP: 123/78 (10 Aug 2023 11:02) (116/70 - 129/86)  BP(mean): --  RR: 18 (10 Aug 2023 11:02) (17 - 18)  SpO2: 98% (10 Aug 2023 11:02) (96% - 98%)    Parameters below as of 10 Aug 2023 11:02  Patient On (Oxygen Delivery Method): nasal cannula  O2 Flow (L/min): 2   Daily     Daily   CAPILLARY BLOOD GLUCOSE        I&O's Summary    09 Aug 2023 07:01  -  10 Aug 2023 07:00  --------------------------------------------------------  IN: 595 mL / OUT: 901 mL / NET: -306 mL    10 Aug 2023 07:01  -  10 Aug 2023 15:54  --------------------------------------------------------  IN: 360 mL / OUT: 0 mL / NET: 360 mL        LABS:                        13.7   7.93  )-----------( 249      ( 09 Aug 2023 15:10 )             42.4     08-10    141  |  104  |  36<H>  ----------------------------<  104<H>  3.6   |  31  |  0.94    Ca    8.5      10 Aug 2023 06:01          Urinalysis Basic - ( 10 Aug 2023 06:01 )    Color: x / Appearance: x / SG: x / pH: x  Gluc: 104 mg/dL / Ketone: x  / Bili: x / Urobili: x   Blood: x / Protein: x / Nitrite: x   Leuk Esterase: x / RBC: x / WBC x   Sq Epi: x / Non Sq Epi: x / Bacteria: x              MEDICATIONS:  acetaminophen     Tablet .. 650 milliGRAM(s) Oral every 6 hours PRN  aluminum hydroxide/magnesium hydroxide/simethicone Suspension 30 milliLiter(s) Oral every 4 hours PRN  atorvastatin 20 milliGRAM(s) Oral at bedtime  diltiazem    Tablet 30 milliGRAM(s) Oral every 8 hours  enoxaparin Injectable 40 milliGRAM(s) SubCutaneous every 24 hours  furosemide    Tablet 20 milliGRAM(s) Oral daily  levalbuterol Inhalation 0.63 milliGRAM(s) Inhalation every 6 hours PRN  losartan 25 milliGRAM(s) Oral daily  melatonin 3 milliGRAM(s) Oral at bedtime PRN  metoprolol succinate ER 50 milliGRAM(s) Oral daily  ondansetron Injectable 8 milliGRAM(s) IV Push every 6 hours PRN  pantoprazole    Tablet 40 milliGRAM(s) Oral before breakfast

## 2023-08-10 NOTE — DISCHARGE NOTE PROVIDER - NSDCFUADDINST_GEN_ALL_CORE_FT
It is important to see your primary physician as well as any specialty physicians within the next week to perform a comprehensive medical review.  Call their offices for an appointment as soon as you leave the hospital.  You will also need to see them for renewal of your medications.  If have any difficulty following with a physician, contact the [facility] [phone] or via [email or web address].   To obtain your results, you can access the [facility] Patient Portal at [email or web address].  Your medical issues appear to be stable at this time, but if your symptoms recur or worsen, contact your physicians and/or return to the hospital if necessary.  If you encounter any issues or questions with your medication, call your physicians before stopping the medication.  Do not drive.  Limit your diet to 2 grams of sodium daily.

## 2023-08-10 NOTE — DISCHARGE NOTE PROVIDER - CARE PROVIDER_API CALL
Primary MD,   Phone: (   )    -  Fax: (   )    -  Follow Up Time:     Roderick Perez  Cardiology  [address] [place] [address]  Phone: [phone]  Fax: [phone]  Follow Up Time:

## 2023-08-10 NOTE — PROGRESS NOTE ADULT - NS ATTEND AMEND GEN_ALL_CORE FT
recurrent symptomatic SVT in addition to intermittent junctional bradycardia  confounded by hyperkalemia this AM, though HR may preclude safe uptitration of AVN agents  if unable to medically manage will refer for EP eval
no repeat episode of SVT  trial of PO diltiazem - if recurrent junctional rhythm will need EP eval
pt with GERD this AM. troponin trending down  also with episodes of short R-P tachycardia, likely AVNRT  increase betablocker as tolerated  if episodes not sufficiently controlled may need to discuss further options
appears to be tolerating diltiazem  cont metoprolol, change diltiazem to 120mg ER daily upon discharge  considering improvement in BP can likely d/c losartan upon discharge as well

## 2023-08-10 NOTE — DISCHARGE NOTE PROVIDER - NSDCFUADDAPPT_GEN_ALL_CORE_FT
APPTS ARE READY TO BE MADE: [X] YES    Best Family or Patient Contact (if needed):    Additional Information about above appointments (if needed):    1:   2:   3:     Other comments or requests:    APPTS ARE READY TO BE MADE: [X] YES    Best Family or Patient Contact (if needed):    Additional Information about above appointments (if needed):    1:   2:   3:     Other comments or requests:   Patient/Caregiver declined scheduling assistance. The assisted living facility will be coordinating the patients appointments on her behalf.

## 2023-08-10 NOTE — DISCHARGE NOTE NURSING/CASE MANAGEMENT/SOCIAL WORK - PATIENT PORTAL LINK FT
You can access the FollowMyHealth Patient Portal offered by [facility] by registering at the following website: [email or web address]. By joining [email or web address]’s FollowMyHealth portal, you will also be able to view your health information using other applications (apps) compatible with our system.

## 2023-08-10 NOTE — DISCHARGE NOTE PROVIDER - HOSPITAL COURSE
89y Female, a resident of an assisted living facility with significant PMH of HTN and HPL was BIBA with c/o chest pressure and elevated HR and BP for further evaluation.  Found to be in SVT.      # SVT - TTE showed EF 50-55%. Cardiology following.  Continue rate control.  No further tachycardic episodes.  Discharge on Cardizem, Lopressor.    # Elevated Troponin - slight elevated trending upward.  Pt without symptoms.  Not indicative of ACS. Troponin now improved.  Cardiology following  # HTN and HPL - Stable and controlled.  Continue her Lipitor.  # ? hypoxia - pt on O2, however, SaO2 steadily 95% on RA.  Denies SOB.  Stable for d/c home.   # DVT prophylaxis: Lovenox sq daily.    Plan of care d/w granddaughter    Stable for return to [facility].

## 2024-02-22 RX ORDER — IPRATROPIUM/ALBUTEROL SULFATE 18-103MCG
3 AEROSOL WITH ADAPTER (GRAM) INHALATION
Refills: 0 | DISCHARGE

## 2024-02-22 RX ORDER — NIFEDIPINE 30 MG
1 TABLET, EXTENDED RELEASE 24 HR ORAL
Refills: 0 | DISCHARGE

## 2024-02-22 RX ORDER — NEBIVOLOL HYDROCHLORIDE 5 MG/1
1 TABLET ORAL
Refills: 0 | DISCHARGE

## 2024-02-22 RX ORDER — ATORVASTATIN CALCIUM 80 MG/1
1 TABLET, FILM COATED ORAL
Refills: 0 | DISCHARGE

## 2024-03-03 ENCOUNTER — INPATIENT (INPATIENT)
Facility: HOSPITAL | Age: 89
LOS: 0 days | Discharge: HOME HEALTH | End: 2024-03-04
Attending: INTERNAL MEDICINE | Admitting: INTERNAL MEDICINE
Payer: COMMERCIAL

## 2024-03-03 VITALS
HEART RATE: 82 BPM | TEMPERATURE: 98 F | OXYGEN SATURATION: 100 % | SYSTOLIC BLOOD PRESSURE: 165 MMHG | HEIGHT: 62 IN | DIASTOLIC BLOOD PRESSURE: 96 MMHG | WEIGHT: 130.07 LBS | RESPIRATION RATE: 20 BRPM

## 2024-03-03 DIAGNOSIS — I47.10 SUPRAVENTRICULAR TACHYCARDIA, UNSPECIFIED: ICD-10-CM

## 2024-03-03 DIAGNOSIS — Z98.890 OTHER SPECIFIED POSTPROCEDURAL STATES: Chronic | ICD-10-CM

## 2024-03-03 DIAGNOSIS — I10 ESSENTIAL (PRIMARY) HYPERTENSION: ICD-10-CM

## 2024-03-03 PROBLEM — E78.5 HYPERLIPIDEMIA, UNSPECIFIED: Chronic | Status: ACTIVE | Noted: 2023-08-05

## 2024-03-03 LAB
ALBUMIN SERPL ELPH-MCNC: 3 G/DL — LOW (ref 3.3–5)
ALP SERPL-CCNC: 84 U/L — SIGNIFICANT CHANGE UP (ref 40–120)
ALT FLD-CCNC: 40 U/L — SIGNIFICANT CHANGE UP (ref 12–78)
ANION GAP SERPL CALC-SCNC: 5 MMOL/L — SIGNIFICANT CHANGE UP (ref 5–17)
AST SERPL-CCNC: 37 U/L — SIGNIFICANT CHANGE UP (ref 15–37)
BASOPHILS # BLD AUTO: 0.05 K/UL — SIGNIFICANT CHANGE UP (ref 0–0.2)
BASOPHILS NFR BLD AUTO: 0.6 % — SIGNIFICANT CHANGE UP (ref 0–2)
BILIRUB SERPL-MCNC: 0.6 MG/DL — SIGNIFICANT CHANGE UP (ref 0.2–1.2)
BUN SERPL-MCNC: 20 MG/DL — SIGNIFICANT CHANGE UP (ref 7–23)
CALCIUM SERPL-MCNC: 8.9 MG/DL — SIGNIFICANT CHANGE UP (ref 8.5–10.1)
CHLORIDE SERPL-SCNC: 106 MMOL/L — SIGNIFICANT CHANGE UP (ref 96–108)
CO2 SERPL-SCNC: 33 MMOL/L — HIGH (ref 22–31)
CREAT SERPL-MCNC: 0.96 MG/DL — SIGNIFICANT CHANGE UP (ref 0.5–1.3)
EGFR: 55 ML/MIN/1.73M2 — LOW
EOSINOPHIL # BLD AUTO: 0.36 K/UL — SIGNIFICANT CHANGE UP (ref 0–0.5)
EOSINOPHIL NFR BLD AUTO: 4 % — SIGNIFICANT CHANGE UP (ref 0–6)
GLUCOSE SERPL-MCNC: 124 MG/DL — HIGH (ref 70–99)
HCT VFR BLD CALC: 43.3 % — SIGNIFICANT CHANGE UP (ref 34.5–45)
HGB BLD-MCNC: 13.7 G/DL — SIGNIFICANT CHANGE UP (ref 11.5–15.5)
IMM GRANULOCYTES NFR BLD AUTO: 0.3 % — SIGNIFICANT CHANGE UP (ref 0–0.9)
LYMPHOCYTES # BLD AUTO: 1.89 K/UL — SIGNIFICANT CHANGE UP (ref 1–3.3)
LYMPHOCYTES # BLD AUTO: 20.8 % — SIGNIFICANT CHANGE UP (ref 13–44)
MCHC RBC-ENTMCNC: 28.8 PG — SIGNIFICANT CHANGE UP (ref 27–34)
MCHC RBC-ENTMCNC: 31.6 G/DL — LOW (ref 32–36)
MCV RBC AUTO: 91 FL — SIGNIFICANT CHANGE UP (ref 80–100)
MONOCYTES # BLD AUTO: 0.63 K/UL — SIGNIFICANT CHANGE UP (ref 0–0.9)
MONOCYTES NFR BLD AUTO: 6.9 % — SIGNIFICANT CHANGE UP (ref 2–14)
NEUTROPHILS # BLD AUTO: 6.11 K/UL — SIGNIFICANT CHANGE UP (ref 1.8–7.4)
NEUTROPHILS NFR BLD AUTO: 67.4 % — SIGNIFICANT CHANGE UP (ref 43–77)
NRBC # BLD: 0 /100 WBCS — SIGNIFICANT CHANGE UP (ref 0–0)
PLATELET # BLD AUTO: 207 K/UL — SIGNIFICANT CHANGE UP (ref 150–400)
POTASSIUM SERPL-MCNC: 3.6 MMOL/L — SIGNIFICANT CHANGE UP (ref 3.5–5.3)
POTASSIUM SERPL-SCNC: 3.6 MMOL/L — SIGNIFICANT CHANGE UP (ref 3.5–5.3)
PROT SERPL-MCNC: 6.9 GM/DL — SIGNIFICANT CHANGE UP (ref 6–8.3)
RBC # BLD: 4.76 M/UL — SIGNIFICANT CHANGE UP (ref 3.8–5.2)
RBC # FLD: 15.7 % — HIGH (ref 10.3–14.5)
SODIUM SERPL-SCNC: 144 MMOL/L — SIGNIFICANT CHANGE UP (ref 135–145)
TROPONIN I, HIGH SENSITIVITY RESULT: 31 NG/L — SIGNIFICANT CHANGE UP
WBC # BLD: 9.07 K/UL — SIGNIFICANT CHANGE UP (ref 3.8–10.5)
WBC # FLD AUTO: 9.07 K/UL — SIGNIFICANT CHANGE UP (ref 3.8–10.5)

## 2024-03-03 PROCEDURE — 71045 X-RAY EXAM CHEST 1 VIEW: CPT | Mod: 26

## 2024-03-03 PROCEDURE — 93010 ELECTROCARDIOGRAM REPORT: CPT

## 2024-03-03 PROCEDURE — 99223 1ST HOSP IP/OBS HIGH 75: CPT

## 2024-03-03 PROCEDURE — 99285 EMERGENCY DEPT VISIT HI MDM: CPT

## 2024-03-03 RX ORDER — ATORVASTATIN CALCIUM 80 MG/1
20 TABLET, FILM COATED ORAL AT BEDTIME
Refills: 0 | Status: DISCONTINUED | OUTPATIENT
Start: 2024-03-03 | End: 2024-03-04

## 2024-03-03 RX ORDER — ACETAMINOPHEN 500 MG
650 TABLET ORAL EVERY 6 HOURS
Refills: 0 | Status: DISCONTINUED | OUTPATIENT
Start: 2024-03-03 | End: 2024-03-04

## 2024-03-03 RX ORDER — DILTIAZEM HCL 120 MG
120 CAPSULE, EXT RELEASE 24 HR ORAL ONCE
Refills: 0 | Status: COMPLETED | OUTPATIENT
Start: 2024-03-03 | End: 2024-03-03

## 2024-03-03 RX ORDER — DILTIAZEM HCL 120 MG
180 CAPSULE, EXT RELEASE 24 HR ORAL DAILY
Refills: 0 | Status: DISCONTINUED | OUTPATIENT
Start: 2024-03-03 | End: 2024-03-04

## 2024-03-03 RX ORDER — ONDANSETRON 8 MG/1
4 TABLET, FILM COATED ORAL EVERY 8 HOURS
Refills: 0 | Status: DISCONTINUED | OUTPATIENT
Start: 2024-03-03 | End: 2024-03-04

## 2024-03-03 RX ORDER — METOPROLOL TARTRATE 50 MG
50 TABLET ORAL ONCE
Refills: 0 | Status: COMPLETED | OUTPATIENT
Start: 2024-03-03 | End: 2024-03-03

## 2024-03-03 RX ORDER — ENOXAPARIN SODIUM 100 MG/ML
30 INJECTION SUBCUTANEOUS EVERY 24 HOURS
Refills: 0 | Status: DISCONTINUED | OUTPATIENT
Start: 2024-03-03 | End: 2024-03-04

## 2024-03-03 RX ORDER — HYDRALAZINE HCL 50 MG
10 TABLET ORAL ONCE
Refills: 0 | Status: COMPLETED | OUTPATIENT
Start: 2024-03-03 | End: 2024-03-03

## 2024-03-03 RX ORDER — LANOLIN ALCOHOL/MO/W.PET/CERES
3 CREAM (GRAM) TOPICAL AT BEDTIME
Refills: 0 | Status: DISCONTINUED | OUTPATIENT
Start: 2024-03-03 | End: 2024-03-04

## 2024-03-03 RX ORDER — METOPROLOL TARTRATE 50 MG
50 TABLET ORAL DAILY
Refills: 0 | Status: DISCONTINUED | OUTPATIENT
Start: 2024-03-04 | End: 2024-03-04

## 2024-03-03 RX ORDER — FUROSEMIDE 40 MG
20 TABLET ORAL DAILY
Refills: 0 | Status: DISCONTINUED | OUTPATIENT
Start: 2024-03-03 | End: 2024-03-04

## 2024-03-03 RX ADMIN — ATORVASTATIN CALCIUM 20 MILLIGRAM(S): 80 TABLET, FILM COATED ORAL at 21:28

## 2024-03-03 RX ADMIN — Medication 1 TABLET(S): at 19:53

## 2024-03-03 RX ADMIN — Medication 180 MILLIGRAM(S): at 18:24

## 2024-03-03 RX ADMIN — Medication 10 MILLIGRAM(S): at 07:08

## 2024-03-03 RX ADMIN — Medication 50 MILLIGRAM(S): at 06:23

## 2024-03-03 RX ADMIN — ENOXAPARIN SODIUM 30 MILLIGRAM(S): 100 INJECTION SUBCUTANEOUS at 12:49

## 2024-03-03 RX ADMIN — Medication 120 MILLIGRAM(S): at 06:19

## 2024-03-03 RX ADMIN — Medication 1 TABLET(S): at 12:49

## 2024-03-03 RX ADMIN — Medication 20 MILLIGRAM(S): at 12:49

## 2024-03-03 NOTE — ED ADULT TRIAGE NOTE - BIRTH SEX
Patient ID: Milvia is a 48 year old female.    Chief Complaint   Patient presents with   • Cough     Tested Negative for Covid 8/20. Cough started 2 weeks ago      Coughing when she came back from [facility] Aug 11  Initially had fever, chills, runny nose and cough. Took robitussina and tylenol fever resolved.  Initially dry, now yellowish phlegm. Worse at night and early morning. Never had this before.   No sick contacts.    No personal history of asthma. Her children have asthma.        Review of Systems   Constitutional: Positive for chills and fatigue (on and off). Negative for appetite change, diaphoresis, fever and unexpected weight change.   HENT: Positive for congestion, postnasal drip, rhinorrhea (on and off, clear), sneezing (occ), sore throat and voice change. Negative for ear pain, sinus pressure, sinus pain and trouble swallowing.    Respiratory: Positive for cough. Negative for shortness of breath.    Cardiovascular: Positive for chest pain (with coughing, substernal). Negative for leg swelling.   Gastrointestinal: Negative for diarrhea and nausea.   Neurological: Negative for headaches.       Visit Vitals  LMP  (LMP Unknown)          Physical Exam  Vitals and nursing note reviewed.   Constitutional:       Appearance: Normal appearance. She is normal weight.      Comments: Occ, no conversation dypnea   HENT:      Head: Normocephalic and atraumatic.      Mouth/Throat:      Mouth: Mucous membranes are moist.   Pulmonary:      Effort: Pulmonary effort is normal.   Neurological:      Mental Status: She is alert and oriented to person, place, and time.   Psychiatric:         Mood and Affect: Mood normal.         Behavior: Behavior normal.         Thought Content: Thought content normal.         Judgment: Judgment normal.             Assessment   Problem List Items Addressed This Visit        Pulmonary and Pneumonias    Cough - Primary     Post viral, should continue to improve  Start Flonase  Notify  office if sx persist or increase                    Health Maintenance Summary     Hepatitis B Vaccine (1 of 3 - 3-dose series)  Overdue - never done    DTaP/Tdap/Td Vaccine (1 - Tdap)  Overdue - never done    Cervical Cancer Screen 30-64 - (PAP/HPV Co-Testing - Every 5 Years)  Overdue - never done    Breast Cancer Screening (Yearly)  Ordered on 6/15/2022    Colorectal Cancer Screen- (Colonoscopy - Every 10 Years)  Ordered on 6/15/2022    Influenza Vaccine (1)  Next due on 9/1/2022    Depression Screening (Yearly)  Next due on 8/25/2023    COVID-19 Vaccine   Completed    Meningococcal Vaccine   Aged Out    HPV Vaccine   Aged Out    Pneumococcal Vaccine 0-64   Aged Out      This visit is being performed virtually via Video visit using [employer].   Clinical [facility] [address] and is physically present in   the [facility] at the time of this visit.         Schedule follow up: as needed - Instructed to call if symptoms worsens, do not improve or new symptoms arise    Marni H Goldberg, MD    Female

## 2024-03-03 NOTE — ED PROVIDER NOTE - CLINICAL SUMMARY MEDICAL DECISION MAKING FREE TEXT BOX
89-year-old with past medical history of hypertension hyperlipidemia CHF SVT presenting with SVT.  Patient lives in nursing home, mostly complaining of shortness of breath, EMS called, patient found to be in SVT by EMS.  Given 6 adenosine with no improvement, then given 12 adenosine which broke SVT.  Patient was admitted last year for SVT and started on metoprolol and diltiazem.  Patient cannot provide reliable historian.  Per EMS when patient was in SVT oxygen saturation 92% so given O2 but once out of SVT saturations returned back to normal.  Patient not complaining of chest pain    Here in emergency department, patient went into SVT 2 more times in addition, both times nonsustained and broken but patient self Valsalva.  Hemodynamically stable entire time, satting well and not in respiratory distress.  Troponin negative.  Will give home dose of diltiazem and metoprolol and admit to medicine for telemetry and possible readjustment of medications

## 2024-03-03 NOTE — H&P ADULT - NSHPLABSRESULTS_GEN_ALL_CORE
13.7   9.07  )-----------( 207      ( 03 Mar 2024 05:09 )             43.3     144  |  106  |  20  ----------------------------<  124<H>     03-03  3.6   |  33<H>  |  0.96    Ca    8.9      03 Mar 2024 05:09    TPro  6.9  /  Alb  3.0<L>  /  TBili  0.6  /  DBili  x   /  AST  37  /  ALT  40  /  AlkPhos  84  03-03

## 2024-03-03 NOTE — ED ADULT TRIAGE NOTE - CHIEF COMPLAINT QUOTE
PT from nursing home S/P SVT  pt given total 18mg adenosine via 20 G R AC, however off bus EMS noted pt converted back to SVT. noted 92 & on room air placed on 6 L NC sat 98 NC

## 2024-03-03 NOTE — ED PROVIDER NOTE - NS ED ROS FT
CONST: no fevers, no chills  EYES: no pain, no vision changes  ENT: no sore throat, no ear pain, no change in hearing  CV: no chest pain, no leg swelling  RESP: (+) shortness of breath, no cough  ABD: no abdominal pain, no nausea, no vomiting, no diarrhea  : no dysuria, no flank pain, no hematuria  MSK: no back pain, no extremity pain  NEURO: no headache or additional neurologic complaints  HEME: no easy bleeding  SKIN:  no rash

## 2024-03-03 NOTE — ED ADULT NURSE NOTE - NSFALLHARMRISKINTERV_ED_ALL_ED

## 2024-03-03 NOTE — INPATIENT CERTIFICATION FOR MEDICARE PATIENTS - CURRENT MEDICAL NEEDS AND CARE PLANS
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

## 2024-03-03 NOTE — PROGRESS NOTE ADULT - ASSESSMENT
89 year old female with a PMH of HTN, HLD, CHF, SVT presents to the ED for SVT. Resides in a nursing home, informed staff she was having SOB, EMS called and patient was found to be in SVT and saturating 92% on RA. On the field patient received adenosine 6mg without improvement and then received 12mg which converted to NSR. While in the ED patient went into SVT twice  which broke by valsalva maneuver. In the ED patient was also found to be hypertensive with SBP 200s. Received Metroprolol and diltiazem. Upon evaluation, resting conformably on the stretcher in NSR, saturating well on RA. Endorses she is feeling well, denies chest pain, palpitation, SOB, headache, dizziness, or any other acute symptoms.  Labs unremarkable.       Problem/Plan - 1:  ·  Problem: SVT (supraventricular tachycardia).   ·  Plan: Was admitted last year for SVT   Endorses medication compliance - receives all meds daily by staff   Currently in NSR   - cont Tele x 24 hours.   start metoprolol and increase cardizem with holding paramters. Consulted Dr. Mejia>>follow recs.   FU labs and ECHO.       Problem/Plan - 2:  ·  Problem: Hypertension.   ordered metoprolol and cardizem. Monitor.    CHF. unspecified. compensated. cont lasix     HLP> cont statin.     FC  DVT ppx: Lovenox.

## 2024-03-03 NOTE — CONSULT NOTE ADULT - ASSESSMENT
89 year old female with a PMH of HTN, HLD, CHF (diastolic dysfunction), known SVT w/ prior episodes (last one 8/2023); presented to the ED with SVT. Resides in a nursing home, informed staff she was having SOB, EMS called and patient was found to be in SVT and saturating 92% on RA. On the field patient received adenosine 6mg without improvement and then received 12mg which converted to NSR. While in the ED patient went into SVT twice  which broke by valsalva maneuver. In the ED patient was also found to be hypertensive with SBP 200s. Received Metroprolol and diltiazem. Upon evaluation, resting conformably on the stretcher in NSR, saturating well on RA. Endorses she is feeling well, denies chest pain, palpitation, SOB, headache, dizziness, or any other acute symptoms.    Trop neg x 1  TSH 2.2 in August    -cont on telemetry, currently in sinus 70- 80s  -cont Cardizem and metoprolol po, monitor closely for karolina arrhthymias -  no further SVTs, junctional rhythm or bradycardia  -TTE with Ef 50-55%, Mod TR, mild MR/AR  -No further chest pressure or substernal discomfort w/ resolution of tachycardia  -TSH wnl  -on Lasix 20 po daily, monitor renal function and electrolytes  -increase activity as tolerated, PT   -outpatient cardiology follow up within one week of discharge

## 2024-03-03 NOTE — H&P ADULT - NSHPPHYSICALEXAM_GEN_ALL_CORE
GENERAL: NAD, comfortable at bedside   HEAD:  Atraumatic, Normocephalic  EYES: EOMI, PERRL, conjunctiva and sclera clear  NECK: Supple, No JVD  LUNG: Clear to auscultation bilaterally; No wheezes, rales or rhonchi  HEART: Regular rate and rhythm; No murmurs, rubs, or gallops  ABDOMEN: Soft, Nontender, Nondistended; Normal Bowel sounds   EXTREMITIES:  2+ Peripheral Pulses, No clubbing, cyanosis, or edema  PSYCH: normal mood and affect  NEUROLOGY: AAOx3, non-focal  SKIN: No rashes or lesions

## 2024-03-03 NOTE — H&P ADULT - PROBLEM SELECTOR PLAN 2
Hypertensive in the ED with SBP >200  Received Hydralazine 10mg IV   Continue home meds   -Cardizem   - Metroprolol  - Lasix   - Monitor BP

## 2024-03-03 NOTE — H&P ADULT - ASSESSMENT
89 year old female with a PMH of HTN, HLD, CHF, SVT presents to the ED for SVT. Resides in a nursing home, informed staff she was having SOB, EMS called and patient was found to be in SVT and saturating 92% on RA. On the field patient received adenosine 6mg without improvement and then received 12mg which converted to NSR. While in the ED patient went into SVT twice  which broke by valsalva maneuver. In the ED patient was also found to be hypertensive with SBP 200s. Received Metroprolol and diltiazem. Upon evaluation, resting conformably on the stretcher in NSR, saturating well on RA. Endorses she is feeling well, denies chest pain, palpitation, SOB, headache, dizziness, or any other acute symptoms.  Labs unremarkable.

## 2024-03-03 NOTE — H&P ADULT - HISTORY OF PRESENT ILLNESS
(1) Oriented to own ability
89 year old female with a PMH of HTN, HLD, CHF, SVT presents to the ED for SVT. Resides in a nursing home, informed staff she was having SOB, EMS called and patient was found to be in SVT and saturating 92% on RA. On the field patient received adenosine 6mg without improvement and then received 12mg which converted to NSR. While in the ED patient went into SVT twice  which broke by valsalva maneuver. In the ED patient was also found to be hypertensive with SBP 200s. Received Metroprolol and diltiazem. Upon evaluation, resting conformably on the stretcher in NSR, saturating well on RA. Endorses she is feeling well, denies chest pain, palpitation, SOB, headache, dizziness, or any other acute symptoms.  Labs unremarkable.

## 2024-03-03 NOTE — H&P ADULT - PROBLEM SELECTOR PLAN 1
Was admitted last year for SVT   Endorses medication compliance - receives all meds daily by staff   Currently in NSR   - Tele   - Cardizem   - Metroprolol  - Cardio consult  - F/U labs  - DVT Prophylaxis Was admitted last year for SVT   Endorses medication compliance - receives all meds daily by staff   Currently in NSR   - Tele   - Cardizem   - Metroprolol  - ECG   - Echo  - Cardio consult  - F/U labs  - DVT Prophylaxis

## 2024-03-03 NOTE — CONSULT NOTE ADULT - SUBJECTIVE AND OBJECTIVE BOX
CARDIOLOGY CONSULT NOTE    Patient is a 89y Female with a known history of :  SVT (supraventricular tachycardia) [I47.10]    Hypertension [I10]      HPI:  89 year old female with a PMH of HTN, HLD, CHF (diastolic dysfunction), known SVT w/ prior episodes (last one 8/2023); presented to the ED with SVT. Resides in a nursing home, informed staff she was having SOB, EMS called and patient was found to be in SVT and saturating 92% on RA. On the field patient received adenosine 6mg without improvement and then received 12mg which converted to NSR. While in the ED patient went into SVT twice  which broke by valsalva maneuver. In the ED patient was also found to be hypertensive with SBP 200s. Received Metroprolol and diltiazem. Upon evaluation, resting conformably on the stretcher in NSR, saturating well on RA. Endorses she is feeling well, denies chest pain, palpitation, SOB, headache, dizziness, or any other acute symptoms.    Trop neg x 1  TSH 2.2 in August      REVIEW OF SYSTEMS:  CONSTITUTIONAL: No fever, weight loss, or fatigue  EYES: No eye pain, visual disturbances, or discharge  ENMT:  No difficulty hearing, tinnitus, vertigo; No sinus or throat pain  NECK: No pain or stiffness  BREASTS: No pain, masses, or nipple discharge  RESPIRATORY: No cough, wheezing, chills or hemoptysis; No shortness of breath  CARDIOVASCULAR: No chest pain, palpitations, dizziness, or leg swelling  GASTROINTESTINAL: No abdominal or epigastric pain. No nausea, vomiting, or hematemesis; No diarrhea or constipation. No melena or hematochezia.  GENITOURINARY: No dysuria, frequency, hematuria, or incontinence  NEUROLOGICAL: No headaches, memory loss, loss of strength, numbness, or tremors  SKIN: No itching, burning, rashes, or lesions   LYMPH NODES: No enlarged glands  ENDOCRINE: No heat or cold intolerance; No hair loss  MUSCULOSKELETAL: No joint pain or swelling; No muscle, back, or extremity pain  PSYCHIATRIC: No depression, anxiety, mood swings, or difficulty sleeping  HEME/LYMPH: No easy bruising, or bleeding gums  ALLERGY AND IMMUNOLOGIC: No hives or eczema    MEDICATIONS  (STANDING):  atorvastatin 20 milliGRAM(s) Oral at bedtime  calcium carbonate 1250 mG  + Vitamin D (OsCal 500 + D) 1 Tablet(s) Oral two times a day  diltiazem    milliGRAM(s) Oral daily  enoxaparin Injectable 30 milliGRAM(s) SubCutaneous every 24 hours  furosemide    Tablet 20 milliGRAM(s) Oral daily  metoprolol succinate ER 50 milliGRAM(s) Oral daily  multivitamin 1 Tablet(s) Oral daily    MEDICATIONS  (PRN):  acetaminophen     Tablet .. 650 milliGRAM(s) Oral every 6 hours PRN Temp greater or equal to 38C (100.4F), Mild Pain (1 - 3)  aluminum hydroxide/magnesium hydroxide/simethicone Suspension 30 milliLiter(s) Oral every 4 hours PRN Dyspepsia  melatonin 3 milliGRAM(s) Oral at bedtime PRN Insomnia  ondansetron Injectable 4 milliGRAM(s) IV Push every 8 hours PRN Nausea and/or Vomiting      ALLERGIES: No Known Allergies      FAMILY HISTORY:  No pertinent family history in first degree relatives        PHYSICAL EXAMINATION:  -----------------------------  T(C): 36.6 (03-03-24 @ 11:21), Max: 36.8 (03-03-24 @ 07:07)  HR: 59 (03-03-24 @ 11:21) (59 - 158)  BP: 146/71 (03-03-24 @ 11:21) (146/71 - 184/100)  RR: 18 (03-03-24 @ 11:21) (18 - 27)  SpO2: 96% (03-03-24 @ 11:21) (95% - 100%)      Constitutional: well developed, normal appearance, well groomed, well nourished, no deformities and no acute distress.   Eyes: the conjunctiva exhibited no abnormalities and the eyelids demonstrated no xanthelasmas.   HEENT: normal oral mucosa, no oral pallor and no oral cyanosis.   Neck: normal jugular venous A waves present, normal jugular venous V waves present and no jugular venous haskins A waves.   Pulmonary: no respiratory distress, normal respiratory rhythm and effort, no accessory muscle use and lungs were clear to auscultation bilaterally.   Cardiovascular: heart rate and rhythm were normal, normal S1 and S2 and no murmur, gallop, rub, heave or thrill are present.   Abdomen: soft, non-tender, no hepato-splenomegaly and no abdominal mass palpated.   Musculoskeletal: the gait could not be assessed..   Extremities: no clubbing of the fingernails, no localized cyanosis, no petechial hemorrhages and no ischemic changes.   Skin: normal skin color and pigmentation, no rash, no venous stasis, no skin lesions, no skin ulcer and no xanthoma was observed.   Psychiatric: oriented to person, place, and time, the affect was normal, the mood was normal and not feeling anxious.       LABS:   --------  03-03    144  |  106  |  20  ----------------------------<  124<H>  3.6   |  33<H>  |  0.96    Ca    8.9      03 Mar 2024 05:09    TPro  6.9  /  Alb  3.0<L>  /  TBili  0.6  /  DBili  x   /  AST  37  /  ALT  40  /  AlkPhos  84  03-03                         13.7   9.07  )-----------( 207      ( 03 Mar 2024 05:09 )             43.3                 RADIOLOGY:  -----------------    < from: TTE Echo Complete w/o Contrast w/ Doppler (08.05.23 @ 10:57) >  Summary:   1. Left ventricular ejection fraction, by visual estimation, is 50 to   55%.   2. Technically limited study.   3. Normal global left ventricular systolic function.   4. Spectral Doppler shows pseudonormal pattern of left ventricular   myocardial filling (Grade II diastolic dysfunction).   5. Global longitudinal strain using Meghan Epic CVx software is -11.2%   at a HR of 70bpm and /71.   6. Mildly enlarged left atrium.   7. Mildly enlarged right atrium.   8. Mild mitral valve regurgitation.   9. Mild thickening of the anterior and posterior mitral valve leaflets.  10. Moderate mitral annular calcification.  11. Moderate tricuspid regurgitation.  12. Mild aortic regurgitation.      [id number] Roderick Perez MD, FAC, RPVI  Electronically signed on 8/5/2023 at 5:31:12 PM    < end of copied text >

## 2024-03-03 NOTE — ED ADULT NURSE NOTE - OBJECTIVE STATEMENT
[facility]. Pt PE[facility] from [facility] living [facility]. Per EMS Pt c/o experiencing SOB 20 minutes prior to the arrival. Pt was noted to be in SVT with highest HR noted to be 178. Pt received 6 mg of adenosine then 12 mg of adenosine via 20 guage placed in R A/C by EMS. Pt was converted to NS rhythm but then went back into SVT upon arrival to ED. Pt placed on  cardiac monitor and Zoll and is noted to be in normal sinus rhythm on monitor. Pt was also placed on 6 liters NC by EMS, per EMS pt was sating at 92%. Pt O2 saturation noted in ED noted to be  98% on RA.

## 2024-03-03 NOTE — PROGRESS NOTE ADULT - SUBJECTIVE AND OBJECTIVE BOX
CHIEF COMPLAINT: FU of recurrent SVT  no chest pain or sob at this time  no report of any sore throat or cough  no n/v/d  no fever      PHYSICAL EXAM:    GENERAL: Elderly pleasant and on NC oxygen.   CHEST/LUNG:  No wheezing, no crackles   HEART: Regular rate and rhythm; +SM  ABDOMEN: Soft, Nontender, Nondistended; Bowel sounds present  EXTREMITIES:  No clubbing, cyanosis, or edema  Psychiatry: AAO x 3, mood is appropriate       OBJECTIVE DATA:   Vital Signs Last 24 Hrs  T(C): 36.6 (03 Mar 2024 11:21), Max: 36.8 (03 Mar 2024 07:07)  T(F): 97.8 (03 Mar 2024 11:21), Max: 98.2 (03 Mar 2024 07:07)  HR: 59 (03 Mar 2024 11:21) (59 - 158)  BP: 146/71 (03 Mar 2024 11:21) (146/71 - 184/100)  BP(mean): --  RR: 18 (03 Mar 2024 11:21) (18 - 27)  SpO2: 96% (03 Mar 2024 11:21) (95% - 100%)    Parameters below as of 03 Mar 2024 11:21  Patient On (Oxygen Delivery Method): room air    Daily Height in cm: 157.48 (03 Mar 2024 04:36)      LABS:                        13.7   9.07  )-----------( 207      ( 03 Mar 2024 05:09 )             43.3             03-03    144  |  106  |  20  ----------------------------<  124<H>  3.6   |  33<H>  |  0.96    Ca    8.9      03 Mar 2024 05:09    TPro  6.9  /  Alb  3.0<L>  /  TBili  0.6  /  DBili  x   /  AST  37  /  ALT  40  /  AlkPhos  84  03-03                Urinalysis Basic - ( 03 Mar 2024 05:09 )    Color: x / Appearance: x / SG: x / pH: x  Gluc: 124 mg/dL / Ketone: x  / Bili: x / Urobili: x   Blood: x / Protein: x / Nitrite: x   Leuk Esterase: x / RBC: x / WBC x   Sq Epi: x / Non Sq Epi: x / Bacteria: x      Interval Radiology studies: reviewed by me  < from: Xray Chest 1 View- PORTABLE-Urgent (Xray Chest 1 View- PORTABLE-Urgent .) (08.05.23 @ 04:22) >    IMPRESSION:    Cardiomegaly.    < end of copied text >      MEDICATIONS  (STANDING):  atorvastatin 20 milliGRAM(s) Oral at bedtime  calcium carbonate 1250 mG  + Vitamin D (OsCal 500 + D) 1 Tablet(s) Oral two times a day  diltiazem    milliGRAM(s) Oral daily  enoxaparin Injectable 30 milliGRAM(s) SubCutaneous every 24 hours  furosemide    Tablet 20 milliGRAM(s) Oral daily  metoprolol succinate ER 50 milliGRAM(s) Oral daily  multivitamin 1 Tablet(s) Oral daily    MEDICATIONS  (PRN):  acetaminophen     Tablet .. 650 milliGRAM(s) Oral every 6 hours PRN Temp greater or equal to 38C (100.4F), Mild Pain (1 - 3)  aluminum hydroxide/magnesium hydroxide/simethicone Suspension 30 milliLiter(s) Oral every 4 hours PRN Dyspepsia  melatonin 3 milliGRAM(s) Oral at bedtime PRN Insomnia  ondansetron Injectable 4 milliGRAM(s) IV Push every 8 hours PRN Nausea and/or Vomiting

## 2024-03-04 VITALS
HEART RATE: 55 BPM | OXYGEN SATURATION: 94 % | DIASTOLIC BLOOD PRESSURE: 77 MMHG | TEMPERATURE: 98 F | RESPIRATION RATE: 18 BRPM | SYSTOLIC BLOOD PRESSURE: 137 MMHG

## 2024-03-04 LAB
24R-OH-CALCIDIOL SERPL-MCNC: 15.9 NG/ML — LOW (ref 30–80)
A1C WITH ESTIMATED AVERAGE GLUCOSE RESULT: 6.2 % — HIGH (ref 4–5.6)
ALBUMIN SERPL ELPH-MCNC: 2.8 G/DL — LOW (ref 3.3–5)
ALP SERPL-CCNC: 79 U/L — SIGNIFICANT CHANGE UP (ref 40–120)
ALT FLD-CCNC: 29 U/L — SIGNIFICANT CHANGE UP (ref 12–78)
ANION GAP SERPL CALC-SCNC: 4 MMOL/L — LOW (ref 5–17)
AST SERPL-CCNC: 27 U/L — SIGNIFICANT CHANGE UP (ref 15–37)
BILIRUB SERPL-MCNC: 0.8 MG/DL — SIGNIFICANT CHANGE UP (ref 0.2–1.2)
BUN SERPL-MCNC: 18 MG/DL — SIGNIFICANT CHANGE UP (ref 7–23)
CALCIUM SERPL-MCNC: 9.3 MG/DL — SIGNIFICANT CHANGE UP (ref 8.5–10.1)
CHLORIDE SERPL-SCNC: 107 MMOL/L — SIGNIFICANT CHANGE UP (ref 96–108)
CHOLEST SERPL-MCNC: 180 MG/DL — SIGNIFICANT CHANGE UP
CO2 SERPL-SCNC: 31 MMOL/L — SIGNIFICANT CHANGE UP (ref 22–31)
CREAT SERPL-MCNC: 0.79 MG/DL — SIGNIFICANT CHANGE UP (ref 0.5–1.3)
EGFR: 70 ML/MIN/1.73M2 — SIGNIFICANT CHANGE UP
ESTIMATED AVERAGE GLUCOSE: 131 MG/DL — HIGH (ref 68–114)
GLUCOSE SERPL-MCNC: 108 MG/DL — HIGH (ref 70–99)
HCT VFR BLD CALC: 38.9 % — SIGNIFICANT CHANGE UP (ref 34.5–45)
HDLC SERPL-MCNC: 65 MG/DL — SIGNIFICANT CHANGE UP
HGB BLD-MCNC: 12.6 G/DL — SIGNIFICANT CHANGE UP (ref 11.5–15.5)
LIPID PNL WITH DIRECT LDL SERPL: 99 MG/DL — SIGNIFICANT CHANGE UP
MAGNESIUM SERPL-MCNC: 1.9 MG/DL — SIGNIFICANT CHANGE UP (ref 1.6–2.6)
MCHC RBC-ENTMCNC: 29.4 PG — SIGNIFICANT CHANGE UP (ref 27–34)
MCHC RBC-ENTMCNC: 32.4 G/DL — SIGNIFICANT CHANGE UP (ref 32–36)
MCV RBC AUTO: 90.7 FL — SIGNIFICANT CHANGE UP (ref 80–100)
NON HDL CHOLESTEROL: 115 MG/DL — SIGNIFICANT CHANGE UP
NRBC # BLD: 0 /100 WBCS — SIGNIFICANT CHANGE UP (ref 0–0)
PHOSPHATE SERPL-MCNC: 3 MG/DL — SIGNIFICANT CHANGE UP (ref 2.5–4.5)
PLATELET # BLD AUTO: 201 K/UL — SIGNIFICANT CHANGE UP (ref 150–400)
POTASSIUM SERPL-MCNC: 3.6 MMOL/L — SIGNIFICANT CHANGE UP (ref 3.5–5.3)
POTASSIUM SERPL-SCNC: 3.6 MMOL/L — SIGNIFICANT CHANGE UP (ref 3.5–5.3)
PROT SERPL-MCNC: 6.4 GM/DL — SIGNIFICANT CHANGE UP (ref 6–8.3)
RBC # BLD: 4.29 M/UL — SIGNIFICANT CHANGE UP (ref 3.8–5.2)
RBC # FLD: 15.7 % — HIGH (ref 10.3–14.5)
SODIUM SERPL-SCNC: 142 MMOL/L — SIGNIFICANT CHANGE UP (ref 135–145)
T4 FREE SERPL-MCNC: 1.2 NG/DL — SIGNIFICANT CHANGE UP (ref 0.9–1.8)
TRIGL SERPL-MCNC: 89 MG/DL — SIGNIFICANT CHANGE UP
TSH SERPL-MCNC: 2.04 UIU/ML — SIGNIFICANT CHANGE UP (ref 0.36–3.74)
WBC # BLD: 6.91 K/UL — SIGNIFICANT CHANGE UP (ref 3.8–10.5)
WBC # FLD AUTO: 6.91 K/UL — SIGNIFICANT CHANGE UP (ref 3.8–10.5)

## 2024-03-04 PROCEDURE — 99239 HOSP IP/OBS DSCHRG MGMT >30: CPT

## 2024-03-04 RX ORDER — DILTIAZEM HCL 120 MG
120 CAPSULE, EXT RELEASE 24 HR ORAL DAILY
Refills: 0 | Status: DISCONTINUED | OUTPATIENT
Start: 2024-03-05 | End: 2024-03-04

## 2024-03-04 RX ORDER — DILTIAZEM HCL 120 MG
1 CAPSULE, EXT RELEASE 24 HR ORAL
Qty: 0 | Refills: 0 | DISCHARGE
Start: 2024-03-04

## 2024-03-04 RX ORDER — ACETAMINOPHEN 500 MG
1 TABLET ORAL
Refills: 0 | DISCHARGE

## 2024-03-04 RX ORDER — FUROSEMIDE 40 MG
1 TABLET ORAL
Qty: 0 | Refills: 0 | DISCHARGE
Start: 2024-03-04

## 2024-03-04 RX ORDER — METOPROLOL TARTRATE 50 MG
1 TABLET ORAL
Qty: 0 | Refills: 0 | DISCHARGE
Start: 2024-03-04

## 2024-03-04 RX ORDER — ATORVASTATIN CALCIUM 80 MG/1
1 TABLET, FILM COATED ORAL
Qty: 0 | Refills: 0 | DISCHARGE
Start: 2024-03-04

## 2024-03-04 RX ADMIN — Medication 180 MILLIGRAM(S): at 05:01

## 2024-03-04 RX ADMIN — Medication 1 TABLET(S): at 18:19

## 2024-03-04 RX ADMIN — Medication 1 TABLET(S): at 13:16

## 2024-03-04 RX ADMIN — Medication 20 MILLIGRAM(S): at 05:00

## 2024-03-04 RX ADMIN — Medication 1 TABLET(S): at 05:15

## 2024-03-04 RX ADMIN — Medication 50 MILLIGRAM(S): at 05:01

## 2024-03-04 RX ADMIN — ENOXAPARIN SODIUM 30 MILLIGRAM(S): 100 INJECTION SUBCUTANEOUS at 13:16

## 2024-03-04 NOTE — DISCHARGE NOTE NURSING/CASE MANAGEMENT/SOCIAL WORK - MODE OF TRANSPORTATION
Ambulette Purse String (Intermediate) Text: Given the location of the defect and the characteristics of the surrounding skin a purse string intermediate closure was deemed most appropriate.  Undermining was performed circumfirentially around the surgical defect.  A purse string suture was then placed and tightened.

## 2024-03-04 NOTE — PATIENT PROFILE ADULT - PATIENT REPRESENTATIVE: ( YOU CAN CHOOSE ANY PERSON THAT CAN ASSIST YOU WITH YOUR HEALTH CARE PREFERENCES, DOES NOT HAVE TO BE A SPOUSE, IMMEDIATE FAMILY OR SIGNIFICANT OTHER/PARTNER)
----- Message from Yamileth Ashley MA sent at 12/4/2018  5:04 PM CST -----  Contact: Porsha [phone] with Dr. Stack's office      ----- Message -----  From: Nani Call  Sent: 12/4/2018   4:30 PM  To: Hayden Gabriel Staff    Please call Porsha to anil an appt for the pt, he is in [place]. Soonest appt I got was in Feb.    Thanks      information could not be obtained

## 2024-03-04 NOTE — DISCHARGE NOTE PROVIDER - NSDCMRMEDTOKEN_GEN_ALL_CORE_FT
atorvastatin 20 mg oral tablet: 1 tab(s) orally once a day (at bedtime)  calcium-vitamin D 500 mg-5 mcg (200 intl units) oral tablet: 1 tab(s) orally 2 times a day  dilTIAZem 120 mg/24 hours oral capsule, extended release: 1 cap(s) orally once a day  furosemide 20 mg oral tablet: 1 tab(s) orally once a day  metoprolol succinate 50 mg oral tablet, extended release: 1 tab(s) orally once a day  Multiple Vitamins oral tablet: 1 tab(s) orally once a day

## 2024-03-04 NOTE — PATIENT PROFILE ADULT - FALL HARM RISK - HARM RISK INTERVENTIONS

## 2024-03-04 NOTE — DISCHARGE NOTE PROVIDER - HOSPITAL COURSE
89 year old female with a PMH of HTN, HLD, CHF, SVT presents to the ED for SVT. Resides in a nursing home, informed staff she was having SOB, EMS called and patient was found to be in SVT and saturating 92% on RA. On the field patient received adenosine 6mg without improvement and then received 12mg which converted to NSR. While in the ED patient went into SVT twice  which broke by valsalva maneuver. In the ED patient was also found to be hypertensive with SBP 200s. Received Metroprolol and diltiazem. Upon evaluation, resting conformably on the stretcher in NSR, saturating well on RA. Endorses she is feeling well, denies chest pain, palpitation, SOB, headache, dizziness, or any other acute symptoms.  Labs unremarkable.     SVT (supraventricular tachycardia).   Endorses medication compliance - receives all meds daily by staff   Currently in NSR   Rate controlled on metoprolol and cardizem     Hypertension.     CHF. unspecified. compensated. cont lasix     HLD  cont statin.     FC  DVT ppx: Lovenox.     On 3/4/24 this case was reviewed with Dr. Zamarripa, the patient is medically stable and optimized for discharge. All medications were reviewed and appropriate prescriptions were sent to mutually agreed upon pharmacy. 89 year old female with a PMH of HTN, HLD, CHF, SVT presents to the ED for SVT. Resides in a nursing home, informed staff she was having SOB, EMS called and patient was found to be in SVT and saturating 92% on RA. On the field patient received adenosine 6mg without improvement and then received 12mg which converted to NSR. While in the ED patient went into SVT twice  which broke by valsalva maneuver. In the ED patient was also found to be hypertensive with SBP 200s. Received Metroprolol and diltiazem. Upon evaluation, resting conformably on the stretcher in NSR, saturating well on RA. Endorses she is feeling well, denies chest pain, palpitation, SOB, headache, dizziness, or any other acute symptoms.  Labs unremarkable.     SVT (supraventricular tachycardia).   Endorses medication compliance - receives all meds daily by staff   Currently in NSR   Rate controlled on metoprolol and cardizem. bradycardia is asymptomatic and likely from increased cardizem dose. Cards consulted. no further inpatient work up.   Patient is ok to be discharged on cardizem 120 mg and metoprolol 50 mg.     Hypertension. controlled.     CHF. unspecified. compensated. cont lasix     HLD  cont statin.         Seen and examined by me today. Vitals stable.   I have discussed all the inpatient radiographic findings with the patient and stressed that patient follows with the PCP for further outpatient care. I have educated patient to use [email or web address] patient portal (as instructed on the discharge paperwork) to access medical records outside the hospital.   All questions welcomed and answered appropriately. Patient verbalized understanding of post discharge physician's follows up and discharge instructions.   DC time spent by me face to face excluding billable procedures 38 mins 89 year old female with a PMH of HTN, HLD, CHF, SVT presents to the ED for SVT. Resides in a nursing home, informed staff she was having SOB, EMS called and patient was found to be in SVT and saturating 92% on RA. On the field patient received adenosine 6mg without improvement and then received 12mg which converted to NSR. While in the ED patient went into SVT twice  which broke by valsalva maneuver. In the ED patient was also found to be hypertensive with SBP 200s. Received Metroprolol and diltiazem. Upon evaluation, resting conformably on the stretcher in NSR, saturating well on RA. Endorses she is feeling well, denies chest pain, palpitation, SOB, headache, dizziness, or any other acute symptoms.  Labs unremarkable.     SVT (supraventricular tachycardia).   Endorses medication compliance - receives all meds daily by staff   Currently in NSR   Rate controlled on metoprolol and cardizem. bradycardia is asymptomatic and likely from increased cardizem dose. Cards consulted. no further inpatient work up.   Patient is ok to be discharged on cardizem 120 mg and metoprolol 50 mg.   Patient improved quicker than expected.     Hypertension. controlled.     CHF. unspecified. compensated. cont lasix     HLD  cont statin.         Seen and examined by me today. Vitals stable.   I have discussed all the inpatient radiographic findings with the patient and stressed that patient follows with the PCP for further outpatient care. I have educated patient to use [employer] patient portal (as instructed on the discharge paperwork) to access medical records outside the hospital.   All questions welcomed and answered appropriately. Patient verbalized understanding of post discharge physician's follows up and discharge instructions.   DC time spent by me face to face excluding billable procedures 38 mins

## 2024-03-04 NOTE — DISCHARGE NOTE NURSING/CASE MANAGEMENT/SOCIAL WORK - NSDCPEFALRISK_GEN_ALL_CORE
For information on Fall & Injury Prevention, visit: [email or web address] OR  [email or web address] OR  https://www.cdc.gov/steadi/patient.html

## 2024-03-04 NOTE — DISCHARGE NOTE PROVIDER - NSDCFUADDINST_GEN_ALL_CORE_FT
1) It is important to see your primary physician as well as other necessary consultants within next 7-10 days to perform a comprehensive medical review.  Call their offices for an appointment as soon as you leave the hospital.  If you do not have a primary physician or unable to reach your PCP, contact the [facility] Physician Referral Service at [phone].  Your medical issues appear to be stable at this time, but if your symptoms recur or worsen, contact your physicians and/or return to the hospital if necessary.  If you encounter any issues or questions with your medication, call your physicians before stopping the medication.    2) Please access [facility] Patient portal (as instructed on the discharge paperwork) to access your medical records at any time after discharge.

## 2024-03-04 NOTE — DISCHARGE NOTE NURSING/CASE MANAGEMENT/SOCIAL WORK - PATIENT PORTAL LINK FT
You can access the FollowMyHealth Patient Portal offered by [facility] by registering at the following website: [email or web address]. By joining [employer]’s FollowMyHealth portal, you will also be able to view your health information using other applications (apps) compatible with our system.

## 2024-03-12 DIAGNOSIS — I08.3 COMBINED RHEUMATIC DISORDERS OF MITRAL, AORTIC AND TRICUSPID VALVES: ICD-10-CM

## 2024-03-12 DIAGNOSIS — E78.5 HYPERLIPIDEMIA, UNSPECIFIED: ICD-10-CM

## 2024-03-12 DIAGNOSIS — I47.10 SUPRAVENTRICULAR TACHYCARDIA, UNSPECIFIED: ICD-10-CM

## 2024-03-12 DIAGNOSIS — I50.32 CHRONIC DIASTOLIC (CONGESTIVE) HEART FAILURE: ICD-10-CM

## 2024-03-12 DIAGNOSIS — I11.0 HYPERTENSIVE HEART DISEASE WITH HEART FAILURE: ICD-10-CM

## 2024-09-29 ENCOUNTER — INPATIENT (INPATIENT)
Facility: HOSPITAL | Age: 89
LOS: 3 days | Discharge: HOME HEALTH | End: 2024-10-03
Attending: STUDENT IN AN ORGANIZED HEALTH CARE EDUCATION/TRAINING PROGRAM | Admitting: STUDENT IN AN ORGANIZED HEALTH CARE EDUCATION/TRAINING PROGRAM
Payer: MEDICARE

## 2024-09-29 VITALS
WEIGHT: 130.07 LBS | DIASTOLIC BLOOD PRESSURE: 115 MMHG | OXYGEN SATURATION: 94 % | HEART RATE: 84 BPM | TEMPERATURE: 99 F | RESPIRATION RATE: 22 BRPM | HEIGHT: 62 IN | SYSTOLIC BLOOD PRESSURE: 212 MMHG

## 2024-09-29 DIAGNOSIS — Z98.890 OTHER SPECIFIED POSTPROCEDURAL STATES: Chronic | ICD-10-CM

## 2024-09-29 LAB
ALBUMIN SERPL ELPH-MCNC: 3.4 G/DL — SIGNIFICANT CHANGE UP (ref 3.3–5)
ALP SERPL-CCNC: 91 U/L — SIGNIFICANT CHANGE UP (ref 40–120)
ALT FLD-CCNC: 16 U/L — SIGNIFICANT CHANGE UP (ref 12–78)
ANION GAP SERPL CALC-SCNC: 6 MMOL/L — SIGNIFICANT CHANGE UP (ref 5–17)
APTT BLD: 31.3 SEC — SIGNIFICANT CHANGE UP (ref 24.5–35.6)
AST SERPL-CCNC: 17 U/L — SIGNIFICANT CHANGE UP (ref 15–37)
BASOPHILS # BLD AUTO: 0.06 K/UL — SIGNIFICANT CHANGE UP (ref 0–0.2)
BASOPHILS NFR BLD AUTO: 0.8 % — SIGNIFICANT CHANGE UP (ref 0–2)
BILIRUB SERPL-MCNC: 0.5 MG/DL — SIGNIFICANT CHANGE UP (ref 0.2–1.2)
BUN SERPL-MCNC: 16 MG/DL — SIGNIFICANT CHANGE UP (ref 7–23)
CALCIUM SERPL-MCNC: 9.2 MG/DL — SIGNIFICANT CHANGE UP (ref 8.5–10.1)
CHLORIDE SERPL-SCNC: 105 MMOL/L — SIGNIFICANT CHANGE UP (ref 96–108)
CO2 SERPL-SCNC: 31 MMOL/L — SIGNIFICANT CHANGE UP (ref 22–31)
CREAT SERPL-MCNC: 0.78 MG/DL — SIGNIFICANT CHANGE UP (ref 0.5–1.3)
D DIMER BLD IA.RAPID-MCNC: 256 NG/ML DDU — HIGH
EGFR: 71 ML/MIN/1.73M2 — SIGNIFICANT CHANGE UP
EOSINOPHIL # BLD AUTO: 0.31 K/UL — SIGNIFICANT CHANGE UP (ref 0–0.5)
EOSINOPHIL NFR BLD AUTO: 4.2 % — SIGNIFICANT CHANGE UP (ref 0–6)
FLUAV AG NPH QL: SIGNIFICANT CHANGE UP
FLUBV AG NPH QL: SIGNIFICANT CHANGE UP
GLUCOSE SERPL-MCNC: 97 MG/DL — SIGNIFICANT CHANGE UP (ref 70–99)
HCT VFR BLD CALC: 41.8 % — SIGNIFICANT CHANGE UP (ref 34.5–45)
HGB BLD-MCNC: 13.4 G/DL — SIGNIFICANT CHANGE UP (ref 11.5–15.5)
IMM GRANULOCYTES NFR BLD AUTO: 0.3 % — SIGNIFICANT CHANGE UP (ref 0–0.9)
INR BLD: 1.07 RATIO — SIGNIFICANT CHANGE UP (ref 0.85–1.16)
LYMPHOCYTES # BLD AUTO: 1.47 K/UL — SIGNIFICANT CHANGE UP (ref 1–3.3)
LYMPHOCYTES # BLD AUTO: 20 % — SIGNIFICANT CHANGE UP (ref 13–44)
MCHC RBC-ENTMCNC: 29 PG — SIGNIFICANT CHANGE UP (ref 27–34)
MCHC RBC-ENTMCNC: 32.1 G/DL — SIGNIFICANT CHANGE UP (ref 32–36)
MCV RBC AUTO: 90.5 FL — SIGNIFICANT CHANGE UP (ref 80–100)
MONOCYTES # BLD AUTO: 0.66 K/UL — SIGNIFICANT CHANGE UP (ref 0–0.9)
MONOCYTES NFR BLD AUTO: 9 % — SIGNIFICANT CHANGE UP (ref 2–14)
NEUTROPHILS # BLD AUTO: 4.82 K/UL — SIGNIFICANT CHANGE UP (ref 1.8–7.4)
NEUTROPHILS NFR BLD AUTO: 65.7 % — SIGNIFICANT CHANGE UP (ref 43–77)
NRBC # BLD: 0 /100 WBCS — SIGNIFICANT CHANGE UP (ref 0–0)
NT-PROBNP SERPL-SCNC: 1760 PG/ML — HIGH (ref 0–450)
PLATELET # BLD AUTO: 236 K/UL — SIGNIFICANT CHANGE UP (ref 150–400)
POTASSIUM SERPL-MCNC: 3.7 MMOL/L — SIGNIFICANT CHANGE UP (ref 3.5–5.3)
POTASSIUM SERPL-SCNC: 3.7 MMOL/L — SIGNIFICANT CHANGE UP (ref 3.5–5.3)
PROT SERPL-MCNC: 7 GM/DL — SIGNIFICANT CHANGE UP (ref 6–8.3)
PROTHROM AB SERPL-ACNC: 12.1 SEC — SIGNIFICANT CHANGE UP (ref 9.9–13.4)
RBC # BLD: 4.62 M/UL — SIGNIFICANT CHANGE UP (ref 3.8–5.2)
RBC # FLD: 15.6 % — HIGH (ref 10.3–14.5)
SARS-COV-2 RNA SPEC QL NAA+PROBE: SIGNIFICANT CHANGE UP
SODIUM SERPL-SCNC: 142 MMOL/L — SIGNIFICANT CHANGE UP (ref 135–145)
TROPONIN I, HIGH SENSITIVITY RESULT: 26.9 NG/L — SIGNIFICANT CHANGE UP
WBC # BLD: 7.34 K/UL — SIGNIFICANT CHANGE UP (ref 3.8–10.5)
WBC # FLD AUTO: 7.34 K/UL — SIGNIFICANT CHANGE UP (ref 3.8–10.5)

## 2024-09-29 PROCEDURE — 93010 ELECTROCARDIOGRAM REPORT: CPT

## 2024-09-29 PROCEDURE — 99223 1ST HOSP IP/OBS HIGH 75: CPT

## 2024-09-29 PROCEDURE — 71275 CT ANGIOGRAPHY CHEST: CPT | Mod: 26,MC

## 2024-09-29 PROCEDURE — 99285 EMERGENCY DEPT VISIT HI MDM: CPT

## 2024-09-29 PROCEDURE — 71045 X-RAY EXAM CHEST 1 VIEW: CPT | Mod: 26

## 2024-09-29 RX ORDER — IPRATROPIUM BROMIDE AND ALBUTEROL SULFATE .5; 3 MG/3ML; MG/3ML
3 SOLUTION RESPIRATORY (INHALATION) EVERY 6 HOURS
Refills: 0 | Status: DISCONTINUED | OUTPATIENT
Start: 2024-09-29 | End: 2024-10-01

## 2024-09-29 RX ORDER — MAG HYDROX/ALUMINUM HYD/SIMETH 200-200-20
30 SUSPENSION, ORAL (FINAL DOSE FORM) ORAL EVERY 4 HOURS
Refills: 0 | Status: DISCONTINUED | OUTPATIENT
Start: 2024-09-29 | End: 2024-10-03

## 2024-09-29 RX ORDER — QUETIAPINE FUMARATE 50 MG/1
0.5 TABLET, FILM COATED ORAL
Refills: 0 | DISCHARGE

## 2024-09-29 RX ORDER — FUROSEMIDE 10 MG/ML
40 INJECTION INTRAVENOUS DAILY
Refills: 0 | Status: DISCONTINUED | OUTPATIENT
Start: 2024-09-30 | End: 2024-10-03

## 2024-09-29 RX ORDER — FUROSEMIDE 10 MG/ML
40 INJECTION INTRAVENOUS ONCE
Refills: 0 | Status: COMPLETED | OUTPATIENT
Start: 2024-09-29 | End: 2024-09-29

## 2024-09-29 RX ORDER — ACETAMINOPHEN 325 MG
975 TABLET ORAL ONCE
Refills: 0 | Status: COMPLETED | OUTPATIENT
Start: 2024-09-29 | End: 2024-09-29

## 2024-09-29 RX ORDER — 5-HYDROXYTRYPTOPHAN (5-HTP) 100 MG
3 TABLET,DISINTEGRATING ORAL AT BEDTIME
Refills: 0 | Status: DISCONTINUED | OUTPATIENT
Start: 2024-09-29 | End: 2024-10-03

## 2024-09-29 RX ORDER — ENOXAPARIN SODIUM 150 MG/ML
40 INJECTION SUBCUTANEOUS EVERY 24 HOURS
Refills: 0 | Status: DISCONTINUED | OUTPATIENT
Start: 2024-09-29 | End: 2024-10-03

## 2024-09-29 RX ORDER — METOPROLOL TARTRATE 50 MG
50 TABLET ORAL DAILY
Refills: 0 | Status: DISCONTINUED | OUTPATIENT
Start: 2024-09-29 | End: 2024-10-03

## 2024-09-29 RX ORDER — ONDANSETRON HCL/PF 4 MG/2 ML
4 VIAL (ML) INJECTION EVERY 8 HOURS
Refills: 0 | Status: DISCONTINUED | OUTPATIENT
Start: 2024-09-29 | End: 2024-10-03

## 2024-09-29 RX ORDER — METOPROLOL TARTRATE 50 MG
1 TABLET ORAL
Refills: 0 | DISCHARGE

## 2024-09-29 RX ORDER — ACETAMINOPHEN 325 MG
650 TABLET ORAL EVERY 6 HOURS
Refills: 0 | Status: DISCONTINUED | OUTPATIENT
Start: 2024-09-29 | End: 2024-10-03

## 2024-09-29 RX ORDER — ATORVASTATIN CALCIUM 10 MG/1
1 TABLET, FILM COATED ORAL
Refills: 0 | DISCHARGE

## 2024-09-29 RX ORDER — QUETIAPINE FUMARATE 50 MG/1
12.5 TABLET, FILM COATED ORAL EVERY 6 HOURS
Refills: 0 | Status: DISCONTINUED | OUTPATIENT
Start: 2024-09-29 | End: 2024-10-03

## 2024-09-29 RX ORDER — HYDRALAZINE HYDROCHLORIDE 100 MG/1
10 TABLET ORAL ONCE
Refills: 0 | Status: COMPLETED | OUTPATIENT
Start: 2024-09-29 | End: 2024-09-29

## 2024-09-29 RX ORDER — ALBUTEROL 90 MCG
2 AEROSOL (GRAM) INHALATION
Refills: 0 | DISCHARGE

## 2024-09-29 RX ORDER — ATORVASTATIN CALCIUM 10 MG/1
10 TABLET, FILM COATED ORAL AT BEDTIME
Refills: 0 | Status: DISCONTINUED | OUTPATIENT
Start: 2024-09-29 | End: 2024-10-03

## 2024-09-29 RX ADMIN — HYDRALAZINE HYDROCHLORIDE 10 MILLIGRAM(S): 100 TABLET ORAL at 06:50

## 2024-09-29 RX ADMIN — ENOXAPARIN SODIUM 40 MILLIGRAM(S): 150 INJECTION SUBCUTANEOUS at 17:42

## 2024-09-29 RX ADMIN — IPRATROPIUM BROMIDE AND ALBUTEROL SULFATE 3 MILLILITER(S): .5; 3 SOLUTION RESPIRATORY (INHALATION) at 23:45

## 2024-09-29 RX ADMIN — IPRATROPIUM BROMIDE AND ALBUTEROL SULFATE 3 MILLILITER(S): .5; 3 SOLUTION RESPIRATORY (INHALATION) at 17:31

## 2024-09-29 RX ADMIN — Medication 975 MILLIGRAM(S): at 13:00

## 2024-09-29 RX ADMIN — Medication 975 MILLIGRAM(S): at 11:55

## 2024-09-29 RX ADMIN — FUROSEMIDE 40 MILLIGRAM(S): 10 INJECTION INTRAVENOUS at 11:56

## 2024-09-29 RX ADMIN — FUROSEMIDE 40 MILLIGRAM(S): 10 INJECTION INTRAVENOUS at 07:31

## 2024-09-29 RX ADMIN — ATORVASTATIN CALCIUM 10 MILLIGRAM(S): 10 TABLET, FILM COATED ORAL at 23:21

## 2024-09-29 NOTE — ED ADULT NURSE NOTE - NSFALLHARMRISKINTERV_ED_ALL_ED

## 2024-09-29 NOTE — ED ADULT NURSE NOTE - CHIEF COMPLAINT QUOTE
BIBA,  pt c/o SOB since midnight, worse when she lies down x 1 week,  got worse during the night.  hypertension 206/125 at assist living facility.   (PMH-HTN, HLD).  +wheezing heard in triage x 1

## 2024-09-29 NOTE — ED PROVIDER NOTE - OBJECTIVE STATEMENT
89 y/o M hx of HTN, HLD , presents for sob for past 1 week. worse in supine position and at night. denies chest pain. states better when she is sitting up in chair. denies fever/chills. denies nausea/vomiting.

## 2024-09-29 NOTE — H&P ADULT - NSHPPHYSICALEXAM_GEN_ALL_CORE
Vital Signs Last 24 Hrs  T(C): 36.5 (29 Sep 2024 11:21), Max: 37.2 (29 Sep 2024 03:42)  T(F): 97.7 (29 Sep 2024 11:21), Max: 99 (29 Sep 2024 03:42)  HR: 79 (29 Sep 2024 11:21) (66 - 84)  BP: 168/96 (29 Sep 2024 11:21) (168/77 - 212/115)  RR: 23 (29 Sep 2024 11:21) (18 - 23)  SpO2: 97% (29 Sep 2024 11:21) (94% - 97%)    Parameters below as of 29 Sep 2024 07:29  Patient On (Oxygen Delivery Method): room air    CONSTITUTIONAL: elderly female, sitting in chair   ENMT: Airway patent, Nasal mucosa clear. Mouth with normal mucosa. Throat has no vesicles, no oropharyngeal exudates and uvula is midline.  EYES: Clear bilaterally, pupils equal, round and reactive to light. EOMI.  CARDIAC: Normal rate, regular rhythm.  Heart sounds S1, S2.  No murmurs, rubs or gallops   RESPIRATORY: Breath sounds clear , mild crackles at bases   MUSCULOSKELETAL: Spine appears normal, range of motion is not limited, no muscle or joint tenderness  EXTREMITIES: trace edema, onychomycosis , cold feet but legs were warm  NEUROLOGICAL: Alert and oriented, no focal deficits, no motor or sensory deficits.  SKIN: No rash, skin turgor

## 2024-09-29 NOTE — H&P ADULT - HISTORY OF PRESENT ILLNESS
Ms. Vargas is a 89 year old [place] from [place], with a PMH of HTN, HLD, CHFpEF, SVT presents to ED w/ shortness of breath for over a week. Pt reports that she feels sob when lying down, also thinks her feet are swollen. Her symptoms get better when she sits up. Pt informed that symptoms are ongoing for almost over a week. She also a dry cough which she attributes to cold, denies any fever, chills, chest pain, palpitations  abdominal pain, urinary symptoms.     In ED, patient's SBP was in 200. She was given hydralazine 10mg IV push followed by Lasix 40mg x2

## 2024-09-29 NOTE — ED ADULT NURSE NOTE - OBJECTIVE STATEMENT
Pt is a 89y F AOX4 with a pmh of HTN and HLD. Pt BIBA from assisted living facility reports sob since midnight, pt states its worse when she lies down for 1 week. Pt also reports generalized weakness. Pt denies cp, abdominal pain, headache, dizziness or lightheadedness.

## 2024-09-29 NOTE — H&P ADULT - ASSESSMENT
Ms. Vargas is a 89 year old female from [place], with a PMH of HTN, HLD, CHFpEF, SVT presents to ED w/ shortness of breath when lying down for over a week, noted to have elevated SBP in 200. Admitted for HTN urgency and CHF exacerbation     A/P:  #HTN urgency   #?CHF exacerbation   #HLD  #DVT ppx     Plan:   Ms. Vargas is a 89 year old female from [place], with a PMH of HTN, HLD, CHFpEF, SVT presents to ED w/ shortness of breath when lying down for over a week, noted to have elevated SBP in 200. Admitted for HTN urgency and CHF exacerbation     A/P:  #HTN urgency   #?CHF exacerbation  #Cardiomegaly w/ left ventricular hypertrophy    #HLD  #DVT ppx     Plan:  -Patient p/w orthopnea, no significant volume overload based on exam and CT chest findings. Pro-BNP elevated but wnl for her age  -s/p IV Lasix 80 mg in ED   -No evidence of pneumonia, or COPD - now wheezing  -CT Angio chest- No PE,   -C/w Lasix 40 mg OD, Duoneb   -Follow repeat ECHO, would also check pulm HTN     -cardio consulted   -BP improved after hydralazine IV push. Resume home meds   -Lovenox SC    Ms. Vargas is a 89 year old female from [place], with a PMH of HTN, HLD, CHFpEF, SVT presents to ED w/ shortness of breath when lying down for over a week, noted to have elevated SBP in 200. Admitted for HTN urgency and CHF exacerbation     A/P:  #HTN urgency   #?CHF exacerbation  #Cardiomegaly w/ left ventricular hypertrophy    #HLD  #DVT ppx     Plan:  -Patient p/w orthopnea, no significant volume overload based on exam and CT chest findings. Pro-BNP elevated but wnl for her age  -s/p IV Lasix 80 mg in ED   -No evidence of pneumonia, or COPD - now wheezing  -CT Angio chest- No PE, Her symptoms could be related to HTN urgency.   -C/w Lasix 40 mg OD, Duoneb   -Follow repeat ECHO, would also check pulm HTN  . prior ECHO w/ normal EF, moderate TR.   -cardio consulted   -BP improved after hydralazine IV push. Per [place] paper work, patient is on metoprolol 100mg ER only. Would resume metoprolol 50 ER and start procardia XL. Monitor BP and adjust medications.   -Lovenox SC

## 2024-09-29 NOTE — ED PROVIDER NOTE - PHYSICAL EXAMINATION
General: Well appearing female in no acute distress  HEENT: Normocephalic, atraumatic. Moist mucous membranes. Oropharynx clear. No lymphadenopathy.  Eyes: No scleral icterus. EOMI. ARNULFO.  Neck:. Soft and supple. Full ROM without pain. No midline tenderness  Cardiac: Regular rate and regular rhythm. No murmurs, rubs, gallops. Peripheral pulses 2+ and symmetric. +b/l LE edema.  Resp: Lungs CTAB. Speaking in full sentences. No wheezes, rales or rhonchi.  Abd: Soft, non-tender, non-distended. No guarding or rebound. No scars, masses, or lesions.  Back: Spine midline and non-tender. No CVA tenderness.    Skin: No rashes, abrasions, or lacerations.  Neuro: AO x 3. Moves all extremities symmetrically. Motor strength and sensation grossly intact.

## 2024-09-29 NOTE — ED PROVIDER NOTE - CLINICAL SUMMARY MEDICAL DECISION MAKING FREE TEXT BOX
89 y/o M hx of HTN, HLD , presents for sob for past 1 week. worse in supine position and at night. denies chest pain. states better when she is sitting up in chair. denies fever/chills. denies nausea/vomiting.   b/l pitting edema in lower extremities  consider PE  consider acute chf   check lytes  EKG NSR, non-ischemic.   cxr   cardiac monitor  BP elevated on arrival, no clear wheezing/crackles on exam

## 2024-09-29 NOTE — ED PROVIDER NOTE - QRS
Patient Education     Pharyngitis: Strep (Confirmed)     You have had a positive test for strep throat. Strep throat is a contagious illness. It is spread by coughing, kissing or by touching others after touching your mouth or nose. Symptoms include throat pain which is worse with swallowing, aching all over, headache and fever. It is treated with antibiotic medication. This should help you start to feel better within 1-2 days.  Home care  · Rest at home. Drink plenty of fluids to avoid dehydration.  · No work or school for the first 2 days of taking the antibiotics. After this time, you will not be contagious. You can then return to school or work if you are feeling better.   · The antibiotic medication must be taken for the full 10 days, even if you feel better. This is very important to ensure the infection is treated. It is also important to prevent drug-resistent organisms from developing. If you were given an antibiotic shot, no more antibiotics are needed.  · You may use acetaminophen (Tylenol) or ibuprofen (Motrin, Advil) to control pain or fever, unless another medicine was prescribed for this. (NOTE: If you have chronic liver or kidney disease or ever had a stomach ulcer or GI bleeding, talk with your doctor before using these medicines.)  · Throat lozenges or sprays (such as Chloraseptic) help reduce pain. Gargling with warm salt water will also reduce throat pain. Dissolve 1/2 teaspoon of salt in 1 glass of warm water. This may be useful just before meals.   · Soft foods are okay. Avoid salty or spicy foods.  Follow-up care  Follow up with your healthcare provider or our staff if you are not improving over the next week.  When to seek medical advice  Call your healthcare provider right away if any of these occur:  · Fever as directed by your doctor   · New or worsening ear pain, sinus pain, or headache  · Painful lumps in the back of neck  · Stiff neck  · Lymph nodes are getting larger or becoming soft  in the middle  · Inability to swallow liquids, excessive drooling, or inability to open mouth wide due to throat pain  · Signs of dehydration (very dark urine or no urine, sunken eyes, dizziness)  · Trouble breathing or noisy breathing  · Muffled voice  · New rash  © 7918-3343 [employer]. [address] [place] [address], [place] [address]. All rights reserved. This information is not intended as a substitute for professional medical care. Always follow your healthcare professional's instructions.            88

## 2024-09-29 NOTE — ED ADULT NURSE REASSESSMENT NOTE - NS ED NURSE REASSESS COMMENT FT1
Patient received a/o x4 sitting up at bedside in a chair, she is being monitored on cardiac monitor c/o sob and refusing to go in her bed as she breathes better sitting up. Safety maintained, saturation wnl, lasix given as ordered. RN will continue to monitor.

## 2024-09-30 LAB
ALBUMIN SERPL ELPH-MCNC: 3.4 G/DL — SIGNIFICANT CHANGE UP (ref 3.3–5)
ALP SERPL-CCNC: 88 U/L — SIGNIFICANT CHANGE UP (ref 40–120)
ALT FLD-CCNC: 17 U/L — SIGNIFICANT CHANGE UP (ref 12–78)
ANION GAP SERPL CALC-SCNC: 8 MMOL/L — SIGNIFICANT CHANGE UP (ref 5–17)
AST SERPL-CCNC: 22 U/L — SIGNIFICANT CHANGE UP (ref 15–37)
BASOPHILS # BLD AUTO: 0.06 K/UL — SIGNIFICANT CHANGE UP (ref 0–0.2)
BASOPHILS NFR BLD AUTO: 0.8 % — SIGNIFICANT CHANGE UP (ref 0–2)
BILIRUB SERPL-MCNC: 0.6 MG/DL — SIGNIFICANT CHANGE UP (ref 0.2–1.2)
BUN SERPL-MCNC: 28 MG/DL — HIGH (ref 7–23)
CALCIUM SERPL-MCNC: 9.7 MG/DL — SIGNIFICANT CHANGE UP (ref 8.5–10.1)
CHLORIDE SERPL-SCNC: 100 MMOL/L — SIGNIFICANT CHANGE UP (ref 96–108)
CO2 SERPL-SCNC: 32 MMOL/L — HIGH (ref 22–31)
CREAT SERPL-MCNC: 1.14 MG/DL — SIGNIFICANT CHANGE UP (ref 0.5–1.3)
EGFR: 45 ML/MIN/1.73M2 — LOW
EOSINOPHIL # BLD AUTO: 0.19 K/UL — SIGNIFICANT CHANGE UP (ref 0–0.5)
EOSINOPHIL NFR BLD AUTO: 2.6 % — SIGNIFICANT CHANGE UP (ref 0–6)
GLUCOSE SERPL-MCNC: 102 MG/DL — HIGH (ref 70–99)
HCT VFR BLD CALC: 42.8 % — SIGNIFICANT CHANGE UP (ref 34.5–45)
HGB BLD-MCNC: 13.5 G/DL — SIGNIFICANT CHANGE UP (ref 11.5–15.5)
IMM GRANULOCYTES NFR BLD AUTO: 0.3 % — SIGNIFICANT CHANGE UP (ref 0–0.9)
LYMPHOCYTES # BLD AUTO: 1.2 K/UL — SIGNIFICANT CHANGE UP (ref 1–3.3)
LYMPHOCYTES # BLD AUTO: 16.1 % — SIGNIFICANT CHANGE UP (ref 13–44)
MCHC RBC-ENTMCNC: 29 PG — SIGNIFICANT CHANGE UP (ref 27–34)
MCHC RBC-ENTMCNC: 31.5 G/DL — LOW (ref 32–36)
MCV RBC AUTO: 92 FL — SIGNIFICANT CHANGE UP (ref 80–100)
MONOCYTES # BLD AUTO: 0.72 K/UL — SIGNIFICANT CHANGE UP (ref 0–0.9)
MONOCYTES NFR BLD AUTO: 9.7 % — SIGNIFICANT CHANGE UP (ref 2–14)
NEUTROPHILS # BLD AUTO: 5.25 K/UL — SIGNIFICANT CHANGE UP (ref 1.8–7.4)
NEUTROPHILS NFR BLD AUTO: 70.5 % — SIGNIFICANT CHANGE UP (ref 43–77)
NRBC # BLD: 0 /100 WBCS — SIGNIFICANT CHANGE UP (ref 0–0)
PLATELET # BLD AUTO: 241 K/UL — SIGNIFICANT CHANGE UP (ref 150–400)
POTASSIUM SERPL-MCNC: 3.1 MMOL/L — LOW (ref 3.5–5.3)
POTASSIUM SERPL-SCNC: 3.1 MMOL/L — LOW (ref 3.5–5.3)
PROT SERPL-MCNC: 7.1 GM/DL — SIGNIFICANT CHANGE UP (ref 6–8.3)
RBC # BLD: 4.65 M/UL — SIGNIFICANT CHANGE UP (ref 3.8–5.2)
RBC # FLD: 15.6 % — HIGH (ref 10.3–14.5)
SODIUM SERPL-SCNC: 140 MMOL/L — SIGNIFICANT CHANGE UP (ref 135–145)
WBC # BLD: 7.44 K/UL — SIGNIFICANT CHANGE UP (ref 3.8–10.5)
WBC # FLD AUTO: 7.44 K/UL — SIGNIFICANT CHANGE UP (ref 3.8–10.5)

## 2024-09-30 PROCEDURE — 99223 1ST HOSP IP/OBS HIGH 75: CPT

## 2024-09-30 PROCEDURE — 99233 SBSQ HOSP IP/OBS HIGH 50: CPT

## 2024-09-30 RX ORDER — INFLUENZA VIRUS VACCINE 15; 15; 15; 15 UG/.5ML; UG/.5ML; UG/.5ML; UG/.5ML
0.5 SUSPENSION INTRAMUSCULAR ONCE
Refills: 0 | Status: COMPLETED | OUTPATIENT
Start: 2024-09-30 | End: 2024-09-30

## 2024-09-30 RX ADMIN — ENOXAPARIN SODIUM 40 MILLIGRAM(S): 150 INJECTION SUBCUTANEOUS at 17:44

## 2024-09-30 RX ADMIN — ATORVASTATIN CALCIUM 10 MILLIGRAM(S): 10 TABLET, FILM COATED ORAL at 22:37

## 2024-09-30 RX ADMIN — Medication 30 MILLIGRAM(S): at 06:13

## 2024-09-30 RX ADMIN — Medication 40 MILLIEQUIVALENT(S): at 17:43

## 2024-09-30 RX ADMIN — IPRATROPIUM BROMIDE AND ALBUTEROL SULFATE 3 MILLILITER(S): .5; 3 SOLUTION RESPIRATORY (INHALATION) at 23:05

## 2024-09-30 RX ADMIN — Medication 40 MILLIEQUIVALENT(S): at 11:54

## 2024-09-30 RX ADMIN — IPRATROPIUM BROMIDE AND ALBUTEROL SULFATE 3 MILLILITER(S): .5; 3 SOLUTION RESPIRATORY (INHALATION) at 05:08

## 2024-09-30 RX ADMIN — FUROSEMIDE 40 MILLIGRAM(S): 10 INJECTION INTRAVENOUS at 06:14

## 2024-09-30 RX ADMIN — Medication 50 MILLIGRAM(S): at 06:14

## 2024-09-30 RX ADMIN — IPRATROPIUM BROMIDE AND ALBUTEROL SULFATE 3 MILLILITER(S): .5; 3 SOLUTION RESPIRATORY (INHALATION) at 17:07

## 2024-09-30 NOTE — CONSULT NOTE ADULT - ASSESSMENT
89F HTN, HLD, HFpEF, SVT who presents with hypertension and CHF exacerbation.    -cont IV diuresis  -cont bblocker  -check TTE as none recently

## 2024-09-30 NOTE — CONSULT NOTE ADULT - SUBJECTIVE AND OBJECTIVE BOX
Cardiology Initial Consult    [facility] - Cardiology at [place]  [address] [place] [address], [place] [address]  Office: [phone]  Fax: [phone]    CHIEF COMPLAINT:  SOB    HISTORY OF PRESENT ILLNESS:  89F HTN, HLD, HFpEF, SVT who presented with progressive SOB/HALL/orthopnea and LE edema for the past week.    Found to be hypertensive and volume overloaded.    Feels better with BP control and diuresis.    Allergies    No Known Allergies    Intolerances  	    MEDICATIONS:  enoxaparin Injectable 40 milliGRAM(s) SubCutaneous every 24 hours  furosemide   Injectable 40 milliGRAM(s) IV Push daily  metoprolol succinate ER 50 milliGRAM(s) Oral daily  NIFEdipine XL 30 milliGRAM(s) Oral daily  albuterol/ipratropium for Nebulization 3 milliLiter(s) Nebulizer every 6 hours  acetaminophen     Tablet .. 650 milliGRAM(s) Oral every 6 hours PRN  melatonin 3 milliGRAM(s) Oral at bedtime PRN  ondansetron Injectable 4 milliGRAM(s) IV Push every 8 hours PRN  QUEtiapine 12.5 milliGRAM(s) Oral every 6 hours PRN  aluminum hydroxide/magnesium hydroxide/simethicone Suspension 30 milliLiter(s) Oral every 4 hours PRN  atorvastatin 10 milliGRAM(s) Oral at bedtime  influenza  Vaccine (HIGH DOSE) 0.5 milliLiter(s) IntraMuscular once  potassium chloride    Tablet ER 40 milliEquivalent(s) Oral every 4 hours    PAST MEDICAL & SURGICAL HISTORY:  HTN (hypertension)  Hyperlipidemia  S/P lumpectomy, left breast    FAMILY HISTORY:  No pertinent family history in first degree relatives    SOCIAL HISTORY:    [ ] Non-smoker  [ ] Smoker  [ ] Alcohol    Review of Systems:  Constitutional: [- ] Fever [ ] Chills [ ] Fatigue [ ] Weight change   HEENT: [ ] Blurred vision [ ] Eye pain [ -] Headache [ ] Runny nose [ ] Sore throat   Respiratory: [ ] Cough [ ] Wheezing [x ] Shortness of breath  Cardiovascular: [-] Chest Pain [ ] Palpitations [x ] HALL [ ] PND [ x] Orthopnea  Gastrointestinal: [- ] Abdominal Pain [ ] Diarrhea [ ] Constipation [ ] Hemorrhoids [ ] Nausea [ ] Vomiting  Genitourinary: [ ] Nocturia [- ] Dysuria [ ] Incontinence  Extremities: [x ] Swelling [ ] Joint Pain  Neurologic: [ ] Focal deficit [ ] Paresthesias [- ] Syncope  Skin: [- ] Rash [ ] Ecchymoses [ ] Wounds [ ] Lesions  Psychiatry: [- ] Depression [ ] Suicidal/Homicidal ideation [ ] Anxiety [ ] Sleep disturbances  [ x] 10 point review of systems is otherwise negative except as mentioned above            [ ]Unable to obtain    PHYSICAL EXAM:  T(C): 36.4 (09-30-24 @ 07:08), Max: 37.2 (09-29-24 @ 23:24)  HR: 74 (09-30-24 @ 07:08) (69 - 97)  BP: 149/81 (09-30-24 @ 07:08) (123/70 - 168/96)  RR: 17 (09-30-24 @ 07:08) (15 - 25)  SpO2: 100% (09-30-24 @ 07:08) (96% - 100%)  Wt(kg): --  I&O's Summary      Appearance: No acute distress  HEENT:   mmm  Cardiovascular: Normal S1 S2, elevated JVP, no murmurs, 1+ LE edema  Respiratory: Lungs clear to auscultation	, good air movement  Psychiatry: A & O x 3, Mood & affect appropriate  Gastrointestinal:  soft nt nd normoactive bs	  Skin: no cyanosis	  Neurologic: grossly non-focal  Vascular: Peripheral pulses palpable bilaterally    LABS:	 	  CBC Full  -  ( 30 Sep 2024 07:36 )  WBC Count : 7.44 K/uL  Hemoglobin : 13.5 g/dL  Hematocrit : 42.8 %  Platelet Count - Automated : 241 K/uL    09-30  140  |  100  |  28[H]  ----------------------------<  102[H]  3.1[L]   |  32[H]  |  1.14    09-29  142  |  105  |  16  ----------------------------<  97  3.7   |  31  |  0.78    Ca    9.7      30 Sep 2024 07:36  Ca    9.2      29 Sep 2024 05:20    TPro  7.1  /  Alb  3.4  /  TBili  0.6  /  DBili  x   /  AST  22  /  ALT  17  /  AlkPhos  88  09-30  TPro  7.0  /  Alb  3.4  /  TBili  0.5  /  DBili  x   /  AST  17  /  ALT  16  /  AlkPhos  91  09-29      proBNP:   Lipid Profile:   HgA1c:   TSH:     CARDIAC MARKERS:  Troponin I, High Sensitivity Result: 26.9 ng/L (09-29-24 @ 05:20)    ECG:  	SR, LVH  RADIOLOGY:  OTHER: 	    PREVIOUS DIAGNOSTIC TESTING:    [ ] Echocardiogram: < from: TTE Echo Complete w/o Contrast w/ Doppler (08.05.23 @ 10:57) >  Summary:   1. Left ventricular ejection fraction, by visual estimation, is 50 to   55%.   2. Technically limited study.   3. Normal global left ventricular systolic function.   4. Spectral Doppler shows pseudonormal pattern of left ventricular   myocardial filling (Grade II diastolic dysfunction).   5. Global longitudinal strain using Meg[employer] Epic CVx software is -11.2%   at a HR of 70bpm and /71.   6. Mildly enlarged left atrium.   7. Mildly enlarged right atrium.   8. Mild mitral valve regurgitation.   9. Mild thickening of the anterior and posterior mitral valve leaflets.  10. Moderate mitral annular calcification.  11. Moderate tricuspid regurgitation.  12. Mild aortic regurgitation.    < end of copied text >    [ ] Catheterization:  [ ] Stress Test:

## 2024-09-30 NOTE — PROGRESS NOTE ADULT - SUBJECTIVE AND OBJECTIVE BOX
Patient is a 89y old  Female who presents with a chief complaint of HTN urgency , ?CHF exacerbation (30 Sep 2024 10:43)      INTERVAL HPI/OVERNIGHT EVENTS: no events noted overnight.    MEDICATIONS  (STANDING):  albuterol/ipratropium for Nebulization 3 milliLiter(s) Nebulizer every 6 hours  atorvastatin 10 milliGRAM(s) Oral at bedtime  enoxaparin Injectable 40 milliGRAM(s) SubCutaneous every 24 hours  furosemide   Injectable 40 milliGRAM(s) IV Push daily  influenza  Vaccine (HIGH DOSE) 0.5 milliLiter(s) IntraMuscular once  metoprolol succinate ER 50 milliGRAM(s) Oral daily  NIFEdipine XL 30 milliGRAM(s) Oral daily  potassium chloride    Tablet ER 40 milliEquivalent(s) Oral every 4 hours    MEDICATIONS  (PRN):  acetaminophen     Tablet .. 650 milliGRAM(s) Oral every 6 hours PRN Temp greater or equal to 38C (100.4F), Mild Pain (1 - 3)  aluminum hydroxide/magnesium hydroxide/simethicone Suspension 30 milliLiter(s) Oral every 4 hours PRN Dyspepsia  melatonin 3 milliGRAM(s) Oral at bedtime PRN Insomnia  ondansetron Injectable 4 milliGRAM(s) IV Push every 8 hours PRN Nausea and/or Vomiting  QUEtiapine 12.5 milliGRAM(s) Oral every 6 hours PRN for agitation      __________________________________________________  REVIEW OF SYSTEMS:    CONSTITUTIONAL: No fever,   EYES: no acute visual disturbances  NECK: No pain or stiffness  RESPIRATORY: No cough; No shortness of breath  CARDIOVASCULAR: No chest pain, no palpitations  GASTROINTESTINAL: No pain. No nausea or vomiting; No diarrhea   NEUROLOGICAL: No headache or numbness, no tremors  MUSCULOSKELETAL: No joint pain, no muscle pain  GENITOURINARY: no dysuria, no frequency, no hesitancy  PSYCHIATRY: no depression , no anxiety  ALL OTHER  ROS negative        Vital Signs Last 24 Hrs  T(C): 36.4 (30 Sep 2024 07:08), Max: 37.2 (29 Sep 2024 23:24)  T(F): 97.5 (30 Sep 2024 07:08), Max: 98.9 (29 Sep 2024 23:24)  HR: 74 (30 Sep 2024 07:08) (69 - 97)  BP: 149/81 (30 Sep 2024 07:08) (123/70 - 149/81)  BP(mean): --  RR: 17 (30 Sep 2024 07:08) (15 - 25)  SpO2: 100% (30 Sep 2024 07:08) (96% - 100%)    Parameters below as of 30 Sep 2024 07:08  Patient On (Oxygen Delivery Method): room air        ________________________________________________  PHYSICAL EXAM:  GENERAL: NAD  HEENT: Normocephalic;  conjunctivae and sclerae clear; moist mucous membranes;   NECK : supple  CHEST/LUNG: Clear to auscultation bilaterally with good air entry   HEART: S1 S2  regular; no murmurs, gallops or rubs  ABDOMEN: Soft, Nontender, Nondistended; Bowel sounds present  EXTREMITIES: no cyanosis; no edema; no calf tenderness  SKIN: warm and dry; no rash  NERVOUS SYSTEM:  Awake and alert; Oriented  to place, person and time ; no new deficits    _________________________________________________  LABS:                        13.5   7.44  )-----------( 241      ( 30 Sep 2024 07:36 )             42.8     09-30    140  |  100  |  28[H]  ----------------------------<  102[H]  3.1[L]   |  32[H]  |  1.14    Ca    9.7      30 Sep 2024 07:36    TPro  7.1  /  Alb  3.4  /  TBili  0.6  /  DBili  x   /  AST  22  /  ALT  17  /  AlkPhos  88  09-30    PT/INR - ( 29 Sep 2024 05:20 )   PT: 12.1 sec;   INR: 1.07 ratio         PTT - ( 29 Sep 2024 05:20 )  PTT:31.3 sec  Urinalysis Basic - ( 30 Sep 2024 07:36 )    Color: x / Appearance: x / SG: x / pH: x  Gluc: 102 mg/dL / Ketone: x  / Bili: x / Urobili: x   Blood: x / Protein: x / Nitrite: x   Leuk Esterase: x / RBC: x / WBC x   Sq Epi: x / Non Sq Epi: x / Bacteria: x      CAPILLARY BLOOD GLUCOSE            RADIOLOGY & ADDITIONAL TESTS:    Imaging Personally Reviewed:  YES    Consultant(s) Notes Reviewed:   YES    Care Discussed with Consultants : YES     Plan of care was discussed with patient and /or primary care giver; all questions and concerns were addressed and care was aligned with patient's wishes.

## 2024-09-30 NOTE — PATIENT PROFILE ADULT - FUNCTIONAL ASSESSMENT - DAILY ACTIVITY ASSESSMENT TYPE
PREOPERATIVE HISTORY AND PHYSICAL MEDICAL CLEARANCE       DATE OF ADMISSION:  10/19/23    SURGEON: Dr. Patrice Davis    PROCEDURE: Left knee arthroscopy with lysis of adhesions, manipulation under anesthesia, partial synovectomy     CHIEF COMPLAINT:  Left knee pain    HISTORY OF PRESENT ILLNESS: Patient is a 67 year old year old female who presents today for a pre-operative history and physical She is scheduled for left knee arthroscopy with lysis of adhesions, manipulation under anesthesia, partial synovectomy . Her surgery is scheduled for 10/19/23 with Dr. Patrice Davis. Patient reports to be in general good health and offers no additional health concerns today.     Acute Kidney Injury Prevention:  INDIRA Risk is Medium based on criteria below:  Age >65  Plan  - 24 hours before surgery hold ACE-I/ARB, Diuretics and Potassium  - 5 days before surgery hold NSAIDs     SURGICAL/ANESTHESIA HISTORY:  Negative for past history of problematic or difficult intubations.  Negative for personal history of prior anesthesia complications or reactions.  Positive for family history of anesthesic reactions. Mom had longer time to wake up from surgery.  Negative for personal history of bleeding or clotting disorders.  Negative for family history of bleeding or clotting disorders.  Negative for past history of blood transfusions.   Negative for exposure to hepatitis or TB.  NO loose teeth or removal dental appliances.    ANESTHESIA: General and Adductor Block     Indications for procedure: left knee pain  Prior diagnostic testing: imaging and orthopedic evaluation    Revised Cardiac Risk Index:    - Hx of IHD:  NO    - Hx of Cerebrovascular Disease:  NO    - Hx of Heart Failure:  NO    - Hx of COPD/Lung Disease:  NO    - Hx of Type II DM on insulin:NO    - Hx of Renal insufficiency:  NO  Active cardiovascular/pulmonary symptoms:  NO  ICD device in place NO  Functional capacity:  Good  Recent acute illnesses:  YES , ill  approximtely 10 days ago, recovered  Current anticoagulation therapy:  NO  Tobacco used in last 3 months: NO  ETOH misuse:  NO  Sleep apnea or high risk for sleep apnea:  NO    STOP BANG Questionnaire for Sleep Apnea  [] Snoring?  [] Tired?  [] Observed you stopped breathing/choking/gasping?  [] High blood pressure?  [] BMI >35?  [] Older than age 50?  [] Neck size large (M >17\"/43 cm, F >16\"/41 cm)?   [] Gender=Male?     Low risk = 0-2 questions  Intermediate risk = 3-4 questions  High risk = 5-8 questions     PAST MEDICAL HISTORY:  Past Medical History:   Diagnosis Date   • Mild hyperlipidemia 9/22/2021   • Osteopenia of multiple sites 11/2/2021    Diagnosed 10/21: repeat bone density in 2 years.   • Right shoulder pain    • Subacromial impingement of right shoulder        PAST SURGICAL HISTORY:  Past Surgical History:   Procedure Laterality Date   • Arthrotomy/explore/treat knee joint Left 11/03/2022    Dr. Patrice Davis TKA closed manipulation and IA cortisone   • Colonoscopy  2013    due in 2018   • Knee scope,diagnostic Left 2010   • Knee surgery Left 2012    Left knee arthroscopy - Dr. Owens   • Shoulder arthroscopy Left 07/03/2014    Left shoulder arthroscopic capsular release, subacromial decompression with acromioplasty and bursectomy, left shoulder closed manipulation.   • Shoulder arthroscopy Right 12/31/2020    Dr. Davis    • Shoulder surgery Right 05/26/2020    closed manipulation   • Tonsillectomy and adenoidectomy     • Total knee replacement Left 07/25/2022    Dr. Davis   • Varicose vein surgery  2012       MEDICATIONS:  Current Medications    BETAMETHASONE DIPROPIONATE (DIPROSONE) 0.05 % CREAM    APPLY TOPICALLY TO THE AFFECTED AREA TWICE DAILY AS NEEDED    FLUTICASONE (FLONASE) 50 MCG/ACT NASAL SPRAY    Spray 2 sprays in each nostril daily.    GABAPENTIN (NEURONTIN) 300 MG CAPSULE    TAKE 1 CAPSULE BY MOUTH EVERY NIGHT AT BEDTIME FOR SLEEP    MELOXICAM (MOBIC) 15 MG TABLET    Take 1  tablet by mouth daily.        ALLERGIES:  ALLERGIES:  No Known Allergies    SOCIAL HISTORY:  Social History     Tobacco Use   • Smoking status: Never   • Smokeless tobacco: Never   Vaping Use   • Vaping Use: never used   Substance Use Topics   • Alcohol use: Yes     Alcohol/week: 1.0 standard drink of alcohol     Types: 1 Standard drinks or equivalent per week     Comment: socially   • Drug use: No       FAMILY HISTORY:  Family History   Problem Relation Age of Onset   • Birth defects Mother 0        born with only one kidney   • Aneurysm Mother    • COPD Father    • COPD Sister    • Substance abuse Sister    • Cancer Sister 46        lymphoma       REVIEW OF SYSTEMS:   GENERAL: Patient states that  She has been in overall good health over the course of the past year. Denies unexpected weight loss or fatigue.  EYES, EARS, NOSE, and THROAT: Denies any visual disturbances, eye discomfort, drainage, or redness. No hearing deficits reported. No pain or ear drainage. Denies epistaxis, chronic congestion, or postnasal drip. No difficulty with chewing or swallowing. No report of oral lesions. Denies sore throat or change in voice quality.  CARDIOVASCULAR: Denies chest pain, palpitations, or dyspnea with exertion.   PULMONARY: Denies cough, shortness of breath, or recent respiratory infection. Sleeps well at night.   Gl: No change in bowel pattern. No constipation or diarrhea.   : No dysuria, hematuria or incontinence.   NEUROLOGICAL: Has not had any trouble with balance or coordination. No significant loss of memory reported, or tremor.   MUSCULOSKELETAL: Denies any joint stiffness or swelling, myalgias or bone pain.   PSYCHIATRIC: Satisfied with her current mood. No reported issues with depression or anxiety.       PHYSICAL EXAMINATION:   GENERAL: Pleasant well developed, well nurished 67 year old female  presents in no acute distress. She is alert and oriented to person, place and time. Affect is stable and is able to  answer all questions appropriately.   VITAL SIGNS.   Visit Vitals  /66 (BP Location: LUE - Left upper extremity, Patient Position: Sitting, Cuff Size: Large Adult)   Pulse 68   Temp 97.6 °F (36.4 °C) (Temporal)   Ht 5' 7\" (1.702 m)   Wt 67.9 kg (149 lb 12.8 oz)   LMP  (LMP Unknown)   BMI 23.46 kg/m²     EYES:  Conjunctiva is clear and non-injected. Pupils equal, round, reactive to light and accommodation.   ENT:  Pinna without masses, lesions or discharge. TM's clear bilaterally. Auditory acuity appears adequate. No nasal deformities noted and mucosa is pink and non congested appearing. Oropharynx reveals mucosa and gingivae that is pink and moist. Posterior pharynx without erythema or exudate. Tongue protrudes midline. Teeth are in good repair.  NECK: supple, trachea is midline, no cervical or supra-clavicular lymphadenopathy appreciated. Thyroid does not appear enlarged.  RESPIRATORY:  Respirations are easy and unlabored.  Lungs are clear to auscultation throughout all fields.   CARDIOVASCULAR:  Normal S1 and S2, without murmer. No pedal edema noted. Carotid arteries without bruits.   ABDOMEN: Abdomen soft, no hepatosplenomegaly, masses, or tenderness. Normoactive bowel sounds noted.   MUSCULOSKELETAL:Gait is steady and even without weakness or ataxia. Upper and lower extremities appear to have full range of motion that is even and symmetrical.   NEUROLOGICAL: Cranial nerves 2-12 are grossly intact, Bilateral popliteal reflexes are  2+and symmetrical.  SKIN: Warm, moist and without erythema, rash, or lesions. Turgor appears good.      DIAGNOSTIC/LABORATORY DATA:  CBC, CRP, ESR normal    IMPRESSION/DIAGNOSIS:  · Preoperative medical clearance consultation.     PLAN OF CARE:  · This is a 67 year old year old female with no contraindications to surgery pending review of the final results of all pre-op labs and diagnostic studies.  · The patient is to avoid NSAID's, aspirin, and alcohol for the week preceding  surgery.   · All other chronic medications are to be continued unless an alternative plan was advised.  · The procedure will be performed at [facility] on 10[facility]19[facility]23.  · 2 gram Ancef will be administered within 60 minutes of the surgical start time for antibiotic prophylaxis and continues for 24 hours postoperatively.  · Perioperative DVT prophylaxis: Mechanical and Pharmacologic(when appropriate).  · Patient was instructed that they will be on Aspirin 81 mg twice daily with food for DVT prophylaxis.  · Preoperative teaching was completed with the patient.  Questions and concerns were addressed.   They agreed with the current assessment and plan of care.  · Content of the pre-operative teaching included: \"Hibiclens Bathing Instruction\", \"How to Cough Effectively\", \"Constipation Handout\", \"Things to Know for Surgery\", \"Medication Refill Pollicy\" and \"Acquiring Crutches/Walkers/Knee Scooters for Surgery\"  · Teaching Method: verbal and handouts  · PDMP reviewed; therapy apprpriate, no abberant behavior identified, prescription given.  · Work slip no needed per patient  · The patient's anticipated discharge disposition: Home; with out patient therapy at [facility] on 10/20/23.  · Postoperative care per Dr. Patrice Hidalgo, NP   10/12/2023 10:14 AM                Admission

## 2024-10-01 LAB
ANION GAP SERPL CALC-SCNC: 7 MMOL/L — SIGNIFICANT CHANGE UP (ref 5–17)
BUN SERPL-MCNC: 30 MG/DL — HIGH (ref 7–23)
CALCIUM SERPL-MCNC: 9.3 MG/DL — SIGNIFICANT CHANGE UP (ref 8.5–10.1)
CHLORIDE SERPL-SCNC: 103 MMOL/L — SIGNIFICANT CHANGE UP (ref 96–108)
CO2 SERPL-SCNC: 30 MMOL/L — SIGNIFICANT CHANGE UP (ref 22–31)
CREAT SERPL-MCNC: 1.01 MG/DL — SIGNIFICANT CHANGE UP (ref 0.5–1.3)
EGFR: 52 ML/MIN/1.73M2 — LOW
GLUCOSE SERPL-MCNC: 107 MG/DL — HIGH (ref 70–99)
HCT VFR BLD CALC: 41.9 % — SIGNIFICANT CHANGE UP (ref 34.5–45)
HGB BLD-MCNC: 13.1 G/DL — SIGNIFICANT CHANGE UP (ref 11.5–15.5)
MCHC RBC-ENTMCNC: 29 PG — SIGNIFICANT CHANGE UP (ref 27–34)
MCHC RBC-ENTMCNC: 31.3 G/DL — LOW (ref 32–36)
MCV RBC AUTO: 92.9 FL — SIGNIFICANT CHANGE UP (ref 80–100)
NRBC # BLD: 0 /100 WBCS — SIGNIFICANT CHANGE UP (ref 0–0)
PLATELET # BLD AUTO: 238 K/UL — SIGNIFICANT CHANGE UP (ref 150–400)
POTASSIUM SERPL-MCNC: 3.9 MMOL/L — SIGNIFICANT CHANGE UP (ref 3.5–5.3)
POTASSIUM SERPL-SCNC: 3.9 MMOL/L — SIGNIFICANT CHANGE UP (ref 3.5–5.3)
RBC # BLD: 4.51 M/UL — SIGNIFICANT CHANGE UP (ref 3.8–5.2)
RBC # FLD: 15.5 % — HIGH (ref 10.3–14.5)
SODIUM SERPL-SCNC: 140 MMOL/L — SIGNIFICANT CHANGE UP (ref 135–145)
WBC # BLD: 8.32 K/UL — SIGNIFICANT CHANGE UP (ref 3.8–10.5)
WBC # FLD AUTO: 8.32 K/UL — SIGNIFICANT CHANGE UP (ref 3.8–10.5)

## 2024-10-01 PROCEDURE — 99232 SBSQ HOSP IP/OBS MODERATE 35: CPT

## 2024-10-01 PROCEDURE — 99233 SBSQ HOSP IP/OBS HIGH 50: CPT | Mod: FS

## 2024-10-01 PROCEDURE — 93306 TTE W/DOPPLER COMPLETE: CPT | Mod: 26

## 2024-10-01 RX ORDER — IPRATROPIUM BROMIDE AND ALBUTEROL SULFATE .5; 3 MG/3ML; MG/3ML
3 SOLUTION RESPIRATORY (INHALATION) EVERY 6 HOURS
Refills: 0 | Status: DISCONTINUED | OUTPATIENT
Start: 2024-10-01 | End: 2024-10-03

## 2024-10-01 RX ADMIN — ENOXAPARIN SODIUM 40 MILLIGRAM(S): 150 INJECTION SUBCUTANEOUS at 16:52

## 2024-10-01 RX ADMIN — FUROSEMIDE 40 MILLIGRAM(S): 10 INJECTION INTRAVENOUS at 05:55

## 2024-10-01 RX ADMIN — Medication 30 MILLIGRAM(S): at 05:54

## 2024-10-01 RX ADMIN — IPRATROPIUM BROMIDE AND ALBUTEROL SULFATE 3 MILLILITER(S): .5; 3 SOLUTION RESPIRATORY (INHALATION) at 11:23

## 2024-10-01 RX ADMIN — Medication 50 MILLIGRAM(S): at 05:54

## 2024-10-01 RX ADMIN — ATORVASTATIN CALCIUM 10 MILLIGRAM(S): 10 TABLET, FILM COATED ORAL at 21:30

## 2024-10-01 RX ADMIN — IPRATROPIUM BROMIDE AND ALBUTEROL SULFATE 3 MILLILITER(S): .5; 3 SOLUTION RESPIRATORY (INHALATION) at 05:51

## 2024-10-01 NOTE — PROGRESS NOTE ADULT - SUBJECTIVE AND OBJECTIVE BOX
HPI:  Ms. Vargas is a 89 year [place], with a PMH of HTN, HLD, CHFpEF, SVT presents to ED w/ shortness of breath for over a week. Pt reports that she feels sob when lying down, also thinks her feet are swollen. Her symptoms get better when she sits up. Pt informed that symptoms are ongoing for almost over a week. She also a dry cough which she attributes to cold, denies any fever, chills, chest pain, palpitations  abdominal pain, urinary symptoms.     In ED, patient's SBP was in 200. She was given hydralazine 10mg IV push followed by Lasix 40mg x2   (29 Sep 2024 14:19)  Patient is a 89y old  Female who presents with a chief complaint of HTN urgency , ?CHF exacerbation (01 Oct 2024 09:29)      INTERVAL HPI/OVERNIGHT EVENTS: I am un comfortable in this bed     MEDICATIONS  (STANDING):  atorvastatin 10 milliGRAM(s) Oral at bedtime  enoxaparin Injectable 40 milliGRAM(s) SubCutaneous every 24 hours  furosemide   Injectable 40 milliGRAM(s) IV Push daily  influenza  Vaccine (HIGH DOSE) 0.5 milliLiter(s) IntraMuscular once  metoprolol succinate ER 50 milliGRAM(s) Oral daily  NIFEdipine XL 30 milliGRAM(s) Oral daily    MEDICATIONS  (PRN):  acetaminophen     Tablet .. 650 milliGRAM(s) Oral every 6 hours PRN Temp greater or equal to 38C (100.4F), Mild Pain (1 - 3)  albuterol/ipratropium for Nebulization 3 milliLiter(s) Nebulizer every 6 hours PRN Shortness of Breath and/or Wheezing  aluminum hydroxide/magnesium hydroxide/simethicone Suspension 30 milliLiter(s) Oral every 4 hours PRN Dyspepsia  melatonin 3 milliGRAM(s) Oral at bedtime PRN Insomnia  ondansetron Injectable 4 milliGRAM(s) IV Push every 8 hours PRN Nausea and/or Vomiting  QUEtiapine 12.5 milliGRAM(s) Oral every 6 hours PRN for agitation      Allergies    No Known Allergies    Intolerances        REVIEW OF SYSTEMS:  seems agiated trying to get out of bed claims she will be more comfortable in chair but gives no complaints   Vital Signs Last 24 Hrs  T(C): 36.5 (02 Oct 2024 00:25), Max: 36.8 (01 Oct 2024 16:25)  T(F): 97.7 (02 Oct 2024 00:25), Max: 98.3 (01 Oct 2024 16:25)  HR: 84 (02 Oct 2024 00:25) (77 - 89)  BP: 156/71 (02 Oct 2024 00:25) (127/75 - 176/86)  RR: 20 (02 Oct 2024 00:25) (18 - 20)  SpO2: 96% (02 Oct 2024 00:25) (95% - 100%)    Parameters below as of 02 Oct 2024 00:25  Patient On (Oxygen Delivery Method): nasal cannula  O2 Flow (L/min): 2      PHYSICAL EXAM:  GENERAL: NAD, well-groomed, well-developed  HEAD:  Atraumatic, Normocephalic  EYES: EOMI, PERRLA, conjunctiva and sclera clear  ENMT: No tonsillar erythema, exudates, or enlargement; Moist mucous membranes, Good dentition, No lesions  NECK: Supple, No JVD, Normal thyroid  NERVOUS SYSTEM:  Alert & Oriented X3, Good concentration; Motor Strength 5/5 B/L upper and lower extremities; DTRs 2+ intact and symmetric  CHEST/LUNG: Clear to ascultation  bilaterally; No rales, rhonchi, wheezing, or rubs  HEART: Regular rate and rhythm; No murmurs, rubs, or gallops  ABDOMEN: Soft, Nontender, Nondistended; Bowel sounds present  EXTREMITIES:  2+ Peripheral Pulses, No clubbing, cyanosis,  Edema lower extremity   LYMPH: No lymphadenopathy noted  SKIN: No rashes or lesions    LABS:                        13.1   8.32  )-----------( 238      ( 01 Oct 2024 08:13 )             41.9     10-01    140  |  103  |  30[H]  ----------------------------<  107[H]  3.9   |  30  |  1.01    Ca    9.3      01 Oct 2024 08:13    TPro  7.1  /  Alb  3.4  /  TBili  0.6  /  DBili  x   /  AST  22  /  ALT  17  /  AlkPhos  88  09-30      Urinalysis Basic - ( 01 Oct 2024 08:13 )    Color: x / Appearance: x / SG: x / pH: x  Gluc: 107 mg/dL / Ketone: x  / Bili: x / Urobili: x   Blood: x / Protein: x / Nitrite: x   Leuk Esterase: x / RBC: x / WBC x   Sq Epi: x / Non Sq Epi: x / Bacteria: x      CAPILLARY BLOOD GLUCOSE          RADIOLOGY & ADDITIONAL TESTS:  < from: TTE Echo Complete w/o Contrast w/ Doppler (10.01.24 @ 11:49) >   1. Left ventricular systolic function is normal with an ejection   fraction of 61 % by Faustin's method of disks.   2. There is mild (grade 1) left ventricular diastolic dysfunction.   3. Left atrium is severely dilated.   4. Estimated pulmonary artery systolic pressure is 14 mmHg, consistent   with normal pulmonary artery pressure.   5. Mild aortic regurgitation.   6. Severe left ventricular hypertrophy.   7. Technically difficult image quality.   8. There is moderate calcification of the mitral valve annulus.   9. Trileaflet aortic valve with normal systolic excursion. There is   moderate calcification of the aortic valve leaflets.    ___________________________________________________________________________  _____________    < end of copied text >    Imaging Personally Reviewed:  [X ] YES  [ ] NO    Consultant(s) Notes Reviewed:  [ X] YES  [ ] NO    Care Discussed with Consultants/Other Providers [ X] YES  [ ] NO

## 2024-10-01 NOTE — PROGRESS NOTE ADULT - SUBJECTIVE AND OBJECTIVE BOX
Patient is a 89y old  Female who presents with a chief complaint of sob and LE edema     PAST MEDICAL & SURGICAL HISTORY:  HTN (hypertension)    Hyperlipidemia    S/P lumpectomy, left breast    INTERVAL HISTORY:  	  MEDICATIONS:  MEDICATIONS  (STANDING):  albuterol/ipratropium for Nebulization 3 milliLiter(s) Nebulizer every 6 hours  atorvastatin 10 milliGRAM(s) Oral at bedtime  enoxaparin Injectable 40 milliGRAM(s) SubCutaneous every 24 hours  furosemide   Injectable 40 milliGRAM(s) IV Push daily  influenza  Vaccine (HIGH DOSE) 0.5 milliLiter(s) IntraMuscular once  metoprolol succinate ER 50 milliGRAM(s) Oral daily  NIFEdipine XL 30 milliGRAM(s) Oral daily    MEDICATIONS  (PRN):  acetaminophen     Tablet .. 650 milliGRAM(s) Oral every 6 hours PRN Temp greater or equal to 38C (100.4F), Mild Pain (1 - 3)  aluminum hydroxide/magnesium hydroxide/simethicone Suspension 30 milliLiter(s) Oral every 4 hours PRN Dyspepsia  melatonin 3 milliGRAM(s) Oral at bedtime PRN Insomnia  ondansetron Injectable 4 milliGRAM(s) IV Push every 8 hours PRN Nausea and/or Vomiting  QUEtiapine 12.5 milliGRAM(s) Oral every 6 hours PRN for agitation    Vitals:  T(F): 98.1 (10-01-24 @ 04:46), Max: 98.2 (09-30-24 @ 13:59)  HR: 82 (10-01-24 @ 05:59) (82 - 94)  BP: 157/93 (10-01-24 @ 04:46) (114/72 - 157/93)  RR: 18 (10-01-24 @ 04:46) (18 - 18)  SpO2: 97% (10-01-24 @ 05:59) (94% - 97%)    Weight (kg): 59 (09-29 @ 03:42)  BMI (kg/m2): 23.8 (09-29 @ 03:42)    PHYSICAL EXAM:  Neuro: Awake, responsive  CV: S1 S2 RRR  Lungs: CTABL  GI: Soft, BS +, ND, NT  Extremities:     TELEMETRY: < from: CT Angio Chest PE Protocol w/ IV Cont (09.29.24 @ 10:06) >  IMPRESSION: Limited PE study for evaluation of the subsegmental pulmonary   arterial branches. No pulmonary arterial emboli within the other well   visualized pulmonary arteries.    Cardiomegaly with dilation of the left atrium with suggestion of left   ventricular hypertrophy.    Right lower lobe tubular opacity suggestive of an impacted distal or   dilated airway.    < end of copied text >    RADIOLOGY:   < from: CT Angio Chest PE Protocol w/ IV Cont (09.29.24 @ 10:06) >  Limited PE study for evaluation of the subsegmental pulmonary   arterial branches. No pulmonary arterial emboli within the other well   visualized pulmonary arteries.    Cardiomegaly with dilation of the left atrium with suggestion of left   ventricular hypertrophy.    Right lower lobe tubular opacity suggestive of an impacted distal or   dilated airway.    < end of copied text >    DIAGNOSTIC TESTING:    [x ] Echocardiogram:   < from: TTE Echo Complete w/o Contrast w/ Doppler (08.05.23 @ 10:57) >  Left Ventricle:  Global LV systolic function was normal. Left ventricular ejection   fraction, by visual estimation, is 50 to 55%. Spectral Doppler shows   pseudonormal pattern of left ventricular myocardial filling (Grade II   diastolic dysfunction). Global longitudinal strain using Meghan Epic CVx   software is -11.2% at a HR of 70bpm and /71.  Right Ventricle: The right ventricle was not well visualized.  Left Atrium: Mildly enlarged left atrium.  Right Atrium: Mildly enlarged right atrium.  Pericardium: The pericardium was not well visualized.  Mitral Valve: Mild thickening of the anterior and posterior mitral valve   leaflets. There is moderate mitral annular calcification. Mild mitral   valve regurgitation is seen.  Tricuspid Valve: The tricuspid valve is not well seen. Moderate tricuspid   regurgitation is visualized.  Aortic Valve: Mild aortic valve regurgitation is seen.  Pulmonic Valve: The pulmonic valve was not well visualized.  Aorta: Aortic root measured atSinus of Valsalva is normal.  Pulmonary Artery: The pulmonary artery is not well seen.    Summary:   1. Left ventricular ejection fraction, by visual estimation, is 50 to   55%.   2. Technically limited study.   3. Normal global left ventricular systolic function.   4. Spectral Doppler shows pseudonormal pattern of left ventricular   myocardial filling (Grade II diastolic dysfunction).   5. Global longitudinal strain using Meghan Epic CVx software is -11.2%   at a HR of 70bpm and /71.   6. Mildly enlarged left atrium.   7. Mildly enlarged right atrium.   8. Mild mitral valve regurgitation.   9. Mild thickening of the anterior and posterior mitral valve leaflets.  10. Moderate mitral annular calcification.  11. Moderate tricuspid regurgitation.  12. Mild aortic regurgitation.    < end of copied text >    LABS:	 	    CARDIAC MARKERS:  Troponin I, High Sensitivity Result: 26.9 ng/L (09-29 @ 05:20)    01 Oct 2024 08:13    140    |  103    |  30     ----------------------------<  107    3.9     |  30     |  1.01   30 Sep 2024 07:36    140    |  100    |  28     ----------------------------<  102    3.1     |  32     |  1.14   29 Sep 2024 05:20    142    |  105    |  16     ----------------------------<  97     3.7     |  31     |  0.78     Ca    9.3        01 Oct 2024 08:13    TPro  7.1    /  Alb  3.4    /  TBili  0.6    /  DBili  x      /  AST  22     /  ALT  17     /  AlkPhos  88     30 Sep 2024 07:36                        13.1   8.32  )-----------( 238      ( 01 Oct 2024 08:13 )             41.9 ,                       13.5   7.44  )-----------( 241      ( 30 Sep 2024 07:36 )             42.8 ,                       13.4   7.34  )-----------( 236      ( 29 Sep 2024 05:20 )             41.8   pro-BNP: Pro-Brain Natriuretic Peptide: 1760 pg/mL (09.29.24 @ 05:20)    INR: 1.07 ratio (09-29 @ 05:20)           Patient is a 89y old  Female who presents with a chief complaint of sob and LE edema     PAST MEDICAL & SURGICAL HISTORY:  HTN (hypertension)    SVT    CHF    Hyperlipidemia    S/P lumpectomy, left breast    INTERVAL HISTORY: in no acute distress  	  MEDICATIONS:  MEDICATIONS  (STANDING):  albuterol/ipratropium for Nebulization 3 milliLiter(s) Nebulizer every 6 hours PRN  atorvastatin 10 milliGRAM(s) Oral at bedtime  enoxaparin Injectable 40 milliGRAM(s) SubCutaneous every 24 hours  furosemide   Injectable 40 milliGRAM(s) IV Push daily  influenza  Vaccine (HIGH DOSE) 0.5 milliLiter(s) IntraMuscular once  metoprolol succinate ER 50 milliGRAM(s) Oral daily  NIFEdipine XL 30 milliGRAM(s) Oral daily    MEDICATIONS  (PRN):  acetaminophen     Tablet .. 650 milliGRAM(s) Oral every 6 hours PRN Temp greater or equal to 38C (100.4F), Mild Pain (1 - 3)  aluminum hydroxide/magnesium hydroxide/simethicone Suspension 30 milliLiter(s) Oral every 4 hours PRN Dyspepsia  melatonin 3 milliGRAM(s) Oral at bedtime PRN Insomnia  ondansetron Injectable 4 milliGRAM(s) IV Push every 8 hours PRN Nausea and/or Vomiting  QUEtiapine 12.5 milliGRAM(s) Oral every 6 hours PRN for agitation    Vitals:  T(F): 98.1 (10-01-24 @ 04:46), Max: 98.2 (09-30-24 @ 13:59)  HR: 82 (10-01-24 @ 05:59) (82 - 94)  BP: 157/93 (10-01-24 @ 04:46) (114/72 - 157/93)  RR: 18 (10-01-24 @ 04:46) (18 - 18)  SpO2: 97% (10-01-24 @ 05:59) (94% - 97%)    Weight (kg): 59 (09-29 @ 03:42)  BMI (kg/m2): 23.8 (09-29 @ 03:42)    PHYSICAL EXAM:  Neuro: Awake, responsive  CV: S1 S2 RRR +SM  Lungs: diminished to bases   GI: Soft, BS +, ND, NT  Extremities: Trace LE edema    TELEMETRY: < from: CT Angio Chest PE Protocol w/ IV Cont (09.29.24 @ 10:06) >  IMPRESSION: Limited PE study for evaluation of the subsegmental pulmonary   arterial branches. No pulmonary arterial emboli within the other well   visualized pulmonary arteries.    Cardiomegaly with dilation of the left atrium with suggestion of left   ventricular hypertrophy.    Right lower lobe tubular opacity suggestive of an impacted distal or   dilated airway.    < end of copied text >    RADIOLOGY:   < from: CT Angio Chest PE Protocol w/ IV Cont (09.29.24 @ 10:06) >  Limited PE study for evaluation of the subsegmental pulmonary   arterial branches. No pulmonary arterial emboli within the other well   visualized pulmonary arteries.    Cardiomegaly with dilation of the left atrium with suggestion of left   ventricular hypertrophy.    Right lower lobe tubular opacity suggestive of an impacted distal or   dilated airway.    < end of copied text >    DIAGNOSTIC TESTING:    [x ] Echocardiogram:   < from: TTE Echo Complete w/o Contrast w/ Doppler (08.05.23 @ 10:57) >  Left Ventricle:  Global LV systolic function was normal. Left ventricular ejection   fraction, by visual estimation, is 50 to 55%. Spectral Doppler shows   pseudonormal pattern of left ventricular myocardial filling (Grade II   diastolic dysfunction). Global longitudinal strain using Meghan Epic CVx   software is -11.2% at a HR of 70bpm and /71.  Right Ventricle: The right ventricle was not well visualized.  Left Atrium: Mildly enlarged left atrium.  Right Atrium: Mildly enlarged right atrium.  Pericardium: The pericardium was not well visualized.  Mitral Valve: Mild thickening of the anterior and posterior mitral valve   leaflets. There is moderate mitral annular calcification. Mild mitral   valve regurgitation is seen.  Tricuspid Valve: The tricuspid valve is not well seen. Moderate tricuspid   regurgitation is visualized.  Aortic Valve: Mild aortic valve regurgitation is seen.  Pulmonic Valve: The pulmonic valve was not well visualized.  Aorta: Aortic root measured at Sinus of Valsalva is normal.  Pulmonary Artery: The pulmonary artery is not well seen.    Summary:   1. Left ventricular ejection fraction, by visual estimation, is 50 to   55%.   2. Technically limited study.   3. Normal global left ventricular systolic function.   4. Spectral Doppler shows pseudonormal pattern of left ventricular   myocardial filling (Grade II diastolic dysfunction).   5. Global longitudinal strain using Meghan Epic CVx software is -11.2%   at a HR of 70bpm and /71.   6. Mildly enlarged left atrium.   7. Mildly enlarged right atrium.   8. Mild mitral valve regurgitation.   9. Mild thickening of the anterior and posterior mitral valve leaflets.  10. Moderate mitral annular calcification.  11. Moderate tricuspid regurgitation.  12. Mild aortic regurgitation.    < end of copied text >    LABS:	 	    CARDIAC MARKERS:  Troponin I, High Sensitivity Result: 26.9 ng/L (09-29 @ 05:20)    01 Oct 2024 08:13    140    |  103    |  30     ----------------------------<  107    3.9     |  30     |  1.01   30 Sep 2024 07:36    140    |  100    |  28     ----------------------------<  102    3.1     |  32     |  1.14   29 Sep 2024 05:20    142    |  105    |  16     ----------------------------<  97     3.7     |  31     |  0.78     Ca    9.3        01 Oct 2024 08:13    TPro  7.1    /  Alb  3.4    /  TBili  0.6    /  DBili  x      /  AST  22     /  ALT  17     /  AlkPhos  88     30 Sep 2024 07:36                        13.1   8.32  )-----------( 238      ( 01 Oct 2024 08:13 )             41.9 ,                       13.5   7.44  )-----------( 241      ( 30 Sep 2024 07:36 )             42.8 ,                       13.4   7.34  )-----------( 236      ( 29 Sep 2024 05:20 )             41.8   pro-BNP: Pro-Brain Natriuretic Peptide: 1760 pg/mL (09.29.24 @ 05:20)    INR: 1.07 ratio (09-29 @ 05:20)

## 2024-10-02 LAB
ALBUMIN SERPL ELPH-MCNC: 3.5 G/DL — SIGNIFICANT CHANGE UP (ref 3.3–5)
ALP SERPL-CCNC: 89 U/L — SIGNIFICANT CHANGE UP (ref 40–120)
ALT FLD-CCNC: 22 U/L — SIGNIFICANT CHANGE UP (ref 12–78)
ANION GAP SERPL CALC-SCNC: 7 MMOL/L — SIGNIFICANT CHANGE UP (ref 5–17)
AST SERPL-CCNC: 17 U/L — SIGNIFICANT CHANGE UP (ref 15–37)
BILIRUB SERPL-MCNC: 0.6 MG/DL — SIGNIFICANT CHANGE UP (ref 0.2–1.2)
BUN SERPL-MCNC: 33 MG/DL — HIGH (ref 7–23)
CALCIUM SERPL-MCNC: 9.2 MG/DL — SIGNIFICANT CHANGE UP (ref 8.5–10.1)
CHLORIDE SERPL-SCNC: 102 MMOL/L — SIGNIFICANT CHANGE UP (ref 96–108)
CO2 SERPL-SCNC: 31 MMOL/L — SIGNIFICANT CHANGE UP (ref 22–31)
CREAT SERPL-MCNC: 0.91 MG/DL — SIGNIFICANT CHANGE UP (ref 0.5–1.3)
EGFR: 59 ML/MIN/1.73M2 — LOW
GLUCOSE BLDC GLUCOMTR-MCNC: 97 MG/DL — SIGNIFICANT CHANGE UP (ref 70–99)
GLUCOSE SERPL-MCNC: 101 MG/DL — HIGH (ref 70–99)
HCT VFR BLD CALC: 43.7 % — SIGNIFICANT CHANGE UP (ref 34.5–45)
HGB BLD-MCNC: 13.7 G/DL — SIGNIFICANT CHANGE UP (ref 11.5–15.5)
MAGNESIUM SERPL-MCNC: 2.3 MG/DL — SIGNIFICANT CHANGE UP (ref 1.6–2.6)
MCHC RBC-ENTMCNC: 29 PG — SIGNIFICANT CHANGE UP (ref 27–34)
MCHC RBC-ENTMCNC: 31.4 G/DL — LOW (ref 32–36)
MCV RBC AUTO: 92.6 FL — SIGNIFICANT CHANGE UP (ref 80–100)
NRBC # BLD: 0 /100 WBCS — SIGNIFICANT CHANGE UP (ref 0–0)
PHOSPHATE SERPL-MCNC: 3.5 MG/DL — SIGNIFICANT CHANGE UP (ref 2.5–4.5)
PLATELET # BLD AUTO: 243 K/UL — SIGNIFICANT CHANGE UP (ref 150–400)
POTASSIUM SERPL-MCNC: 3.9 MMOL/L — SIGNIFICANT CHANGE UP (ref 3.5–5.3)
POTASSIUM SERPL-SCNC: 3.9 MMOL/L — SIGNIFICANT CHANGE UP (ref 3.5–5.3)
PROT SERPL-MCNC: 7.4 GM/DL — SIGNIFICANT CHANGE UP (ref 6–8.3)
RBC # BLD: 4.72 M/UL — SIGNIFICANT CHANGE UP (ref 3.8–5.2)
RBC # FLD: 15.3 % — HIGH (ref 10.3–14.5)
SODIUM SERPL-SCNC: 140 MMOL/L — SIGNIFICANT CHANGE UP (ref 135–145)
TROPONIN I, HIGH SENSITIVITY RESULT: 28.6 NG/L — SIGNIFICANT CHANGE UP
WBC # BLD: 8.24 K/UL — SIGNIFICANT CHANGE UP (ref 3.8–10.5)
WBC # FLD AUTO: 8.24 K/UL — SIGNIFICANT CHANGE UP (ref 3.8–10.5)

## 2024-10-02 PROCEDURE — 71045 X-RAY EXAM CHEST 1 VIEW: CPT | Mod: 26

## 2024-10-02 PROCEDURE — 93010 ELECTROCARDIOGRAM REPORT: CPT | Mod: 76

## 2024-10-02 PROCEDURE — 99233 SBSQ HOSP IP/OBS HIGH 50: CPT | Mod: FS

## 2024-10-02 PROCEDURE — 99291 CRITICAL CARE FIRST HOUR: CPT

## 2024-10-02 PROCEDURE — 99232 SBSQ HOSP IP/OBS MODERATE 35: CPT

## 2024-10-02 RX ORDER — KETOROLAC TROMETHAMINE 10 MG/1
15 TABLET, FILM COATED ORAL ONCE
Refills: 0 | Status: DISCONTINUED | OUTPATIENT
Start: 2024-10-02 | End: 2024-10-03

## 2024-10-02 RX ADMIN — IPRATROPIUM BROMIDE AND ALBUTEROL SULFATE 3 MILLILITER(S): .5; 3 SOLUTION RESPIRATORY (INHALATION) at 08:04

## 2024-10-02 RX ADMIN — Medication 50 MILLIGRAM(S): at 05:08

## 2024-10-02 RX ADMIN — FUROSEMIDE 40 MILLIGRAM(S): 10 INJECTION INTRAVENOUS at 05:09

## 2024-10-02 RX ADMIN — Medication 0.4 MILLIGRAM(S): at 08:00

## 2024-10-02 RX ADMIN — QUETIAPINE FUMARATE 12.5 MILLIGRAM(S): 50 TABLET, FILM COATED ORAL at 08:02

## 2024-10-02 RX ADMIN — ATORVASTATIN CALCIUM 10 MILLIGRAM(S): 10 TABLET, FILM COATED ORAL at 22:16

## 2024-10-02 RX ADMIN — ENOXAPARIN SODIUM 40 MILLIGRAM(S): 150 INJECTION SUBCUTANEOUS at 18:41

## 2024-10-02 RX ADMIN — Medication 30 MILLIGRAM(S): at 05:08

## 2024-10-02 RX ADMIN — Medication 17 GRAM(S): at 22:15

## 2024-10-02 NOTE — PHYSICAL THERAPY INITIAL EVALUATION ADULT - PERSONAL SAFETY AND JUDGMENT, REHAB EVAL
Date of Service: 04/02/2021    GERIATRICS CONSULTATION SUMMARY    CONSULTING PHYSICIAN:  Hospitalist group and Dr. Benigno Peguero.    REASON FOR CONSULTATION:  Comprehensive geriatrics assessment, delirium on top of major neurocognitive deficits, adult failure to thrive and to clarify goals and plans of care.      Source and reliability of information is the patient's old record that he is very unable to communicate at this time and speaking at length to his sister, Jerica, who is the activated power of .  Her number is [phone].    HISTORY OF PRESENT ILLNESS:  Real is a 89-year-old male known to me from previous consultation.  He currently still lives at [facility] [place] with home hospice through [facility].  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  The patient was seen by general surgery, debridement was made.  The bleeding has stopped.  The patient has been a little bit restless in the hospital and he is trying to rip off the Band-Aid, which is above his right ear.  That is why he has a sitter.  Otherwise, he is hard of hearing, difficult to communicate with.  He is not answering any question legibly.  When I spoke to the sister, she said that he has been stable at the facility.  He is walking around.  She is able to have visitation with him.  His appetite is good, but he actually has lost over 35 pounds since I had seen him a year and half ago.  His memory is very poor.  He sleeps good at night.  He does have a little bit of hallucinations, but no chills or fever or cough or any other constitutional symptoms.    CURRENT AND PRESENT MEDICATIONS IN THE HOSPITAL:  The sister did agree to start comfort medications including:    As needed Ativan, as needed Morphine, and also start Depakote 125 mg twice a day.  He is also on:    Tylenol 500 mg 3 times a day.  Celexa 20 mg 
daily.  Flomax 0.4 mg daily.  Proscar 5 mg daily.    PAST MEDICAL HISTORY:  1.  Major neurocognitive deficits, likely Alzheimer's type dementia with agitation.  2.  Hypertension.  3.  Urinary incontinence.  4.  Squamous cell carcinoma of the ear, which is progressively getting worse, thus he is under hospice care.    SOCIAL HISTORY:  The patient lives at [facility] [place], never , does not have any kids.  He used to work in multiple jobs, mainly in maintenance.  He stopped driving.  No history of smoking.  He did have history of alcohol abuse, but he quit many years ago.  His power of  is his sister, it is activated.    CODE STATUS:  Do not resuscitate.  He was born Buddhism but does not follow any Cheondoism.    FAMILY MEDICAL HISTORY:  His mom had dementia.  His sister had colon cancer.  Another sister had diabetes and hyperlipidemia.    REVIEW OF SYSTEMS:  He is allergic to Lisinopril.  He does wear glasses.  He is hard of hearing but no hearing aids.  He does have dentures.  He does have weight loss.  All other systems reviewed and negative.    PHYSICAL EXAMINATION:  GENERAL:  He is awake, not able to follow any command.  He usually looks at the left side because his right ear is the one with the cancer.  VITAL SIGNS:  His current weight is only 58.3 kilograms compared to 72.6 kilograms in 2019.  His blood pressure 129/58, respirations 17, pulse 84, temperature is 98.3.  SKIN:  Warm and dry.  HEART:  Regular rate and rhythm.    HEENT: Again, the right ear discomfort.  ABDOMEN:  Soft.  LOWER EXTREMITIES:  There is +1 edema, positive pulsations.    LABORATORY DATA:  His hemoglobin is 8.2, his sodium is 135, potassium 3.3, creatinine is 1.02.    ASSESSMENT AND PLAN:  1.  Delirium on top of advanced dementia.  At this time, I would recommend nonpharmacological interventions and adding Depakote twice a day.  2.  In terms of goals of care, I had an extensive discussion with the sister.  The family 
are okay with starting the comfort medicine including as needed Ativan, as needed Morphine and set him up to return back to [facility] [employer] [facility] with hospice care on Monday; [facility] is the agency of choice.  Discussed with the sister if the patient deteriorates further, he may qualify for inpatient hospice and she will be open to that too.  He does have a state DNR, code status is do not resuscitate and power of  is activated.    Time spent was about 50 minutes, greater than 50% was counseling and coordination of care.  Case was discussed with the hospitalist, , family and nursing staff.    Dear Dr. Peguero, I appreciate your consultation.      Dictated By: Jorge A Jules MD  Signing Provider: Jorge A Jules MD    SS/sada ([id number])  DD: 04/02/2021 13:01:04 TD: 04/02/2021 13:15:13    Copy Sent To:     
intact
intact

## 2024-10-02 NOTE — PHYSICAL THERAPY INITIAL EVALUATION ADULT - PLANNED THERAPY INTERVENTIONS, PT EVAL
balance training/gait training/strengthening/transfer training
gait training/strengthening/transfer training

## 2024-10-02 NOTE — PHYSICAL THERAPY INITIAL EVALUATION ADULT - STRENGTHENING, PT EVAL
To be able to improve B UE/LE strength to 1/2 degree stronger to facilitate functional mobility, improve standing tolerance and be able to negotiate obstacles without difficulties by 4-6 weeks
increase MMT on alisa UE/LE by 1 full grade in 4 weeks.

## 2024-10-02 NOTE — PHYSICAL THERAPY INITIAL EVALUATION ADULT - PERTINENT HX OF CURRENT PROBLEM, REHAB EVAL
This is the hx of R.Y. a 89 y/o female patient who [place] [facility] [place] complications of SOB affecting medical condition and with subsequent affection on functional mobility.

## 2024-10-02 NOTE — PHYSICAL THERAPY INITIAL EVALUATION ADULT - GENERAL OBSERVATIONS, REHAB EVAL
Chart reviewed, pt encountered sitting at the side of the bed, AxOx3, cooperative, + O2 via NC, + primafit.
pt encountered in bed supine, + primafit

## 2024-10-02 NOTE — RAPID RESPONSE TEAM SUMMARY - NSSITUATIONBACKGROUNDRRT_GEN_ALL_CORE
This is a 89 year old [place] from [place], with a PMH of HTN, HLD, CHFpEF, SVT, admitted with ADHF and started on IV diuresis. TTE with normal EF, DD I. RRT called this AM for acute onset left sided chest pain, no clear provoking factor. Pt denies having previous chest pain/angina or similar sx in the past. Pain described as sharp, below the left breast, non radiating or referring. Non reproducible to palpation. No pain upon palpation to abdomen. Pt expressing feeling of significant distress and is very unwell appearing, with increased WOB.     Vitals: / 83, HR 78, RR 23, temp 99.4, spo2 99 on 2L, BS 97    EKG on admission with LVH, no significant ST changes or signs of ischemia

## 2024-10-02 NOTE — PHYSICAL THERAPY INITIAL EVALUATION ADULT - IMPAIRMENTS FOUND, PT EVAL
gait, locomotion, and balance/muscle strength
gait, locomotion, and balance/muscle strength/posture/ventilation and respiration/gas exchange

## 2024-10-02 NOTE — PROGRESS NOTE ADULT - SUBJECTIVE AND OBJECTIVE BOX
Patient is a 89y old  Female who presents with a chief complaint of sob and LE edema     PAST MEDICAL & SURGICAL HISTORY:  HTN (hypertension)    SVT    CHF    Hyperlipidemia    S/P lumpectomy, left breast    INTERVAL HISTORY:  	  MEDICATIONS:  MEDICATIONS  (STANDING):  atorvastatin 10 milliGRAM(s) Oral at bedtime  enoxaparin Injectable 40 milliGRAM(s) SubCutaneous every 24 hours  furosemide   Injectable 40 milliGRAM(s) IV Push daily  metoprolol succinate ER 50 milliGRAM(s) Oral daily  NIFEdipine XL 30 milliGRAM(s) Oral daily    MEDICATIONS  (PRN):  acetaminophen     Tablet .. 650 milliGRAM(s) Oral every 6 hours PRN Temp greater or equal to 38C (100.4F), Mild Pain (1 - 3)  albuterol/ipratropium for Nebulization 3 milliLiter(s) Nebulizer every 6 hours PRN Shortness of Breath and/or Wheezing  aluminum hydroxide/magnesium hydroxide/simethicone Suspension 30 milliLiter(s) Oral every 4 hours PRN Dyspepsia  melatonin 3 milliGRAM(s) Oral at bedtime PRN Insomnia  nitroglycerin     SubLingual 0.4 milliGRAM(s) SubLingual every 5 minutes PRN Chest Pain  ondansetron Injectable 4 milliGRAM(s) IV Push every 8 hours PRN Nausea and/or Vomiting  QUEtiapine 12.5 milliGRAM(s) Oral every 6 hours PRN for agitation    Vitals:  T(F): 97.5 (10-02-24 @ 05:05), Max: 98.3 (10-01-24 @ 16:25)  HR: 72 (10-02-24 @ 05:05) (72 - 89)  BP: 132/92 (10-02-24 @ 05:05) (127/75 - 176/86)  RR: 20 (10-02-24 @ 05:05) (18 - 20)  SpO2: 99% (10-02-24 @ 05:05) (95% - 100%)    10-01 @ 07:01  -  10-02 @ 07:00  --------------------------------------------------------  IN:  Total IN: 0 mL    OUT:    Voided (mL): 900 mL  Total OUT: 900 mL    Total NET: -900 mL    10-02 @ 07:01  -  10-02 @ 10:16  --------------------------------------------------------  IN:  Total IN: 0 mL    OUT:    Voided (mL): 850 mL  Total OUT: 850 mL    Total NET: -850 mL    PHYSICAL EXAM:  Neuro: Awake, responsive  CV: S1 S2 RRR, +SM  Lungs: CTABL  GI: Soft, BS +, ND, NT  Extremities: No edema    TELEMETRY:     RADIOLOGY: < from: CT Angio Chest PE Protocol w/ IV Cont (09.29.24 @ 10:06) >  Limited PE study for evaluation of the subsegmental pulmonary   arterial branches. No pulmonary arterial emboli within the other well   visualized pulmonary arteries.    Cardiomegaly with dilation of the left atrium with suggestion of left   ventricular hypertrophy.    Right lower lobe tubular opacity suggestive of an impacted distal or   dilated airway.    < end of copied text >    < from: Xray Chest 1 View- PORTABLE-Urgent (Xray Chest 1 View- PORTABLE-Urgent .) (09.29.24 @ 06:59) >  Mild cardiomegaly is suspected. Mediastinum unremarkable.   Lungs clear with diffuse coarsened lung markings again seen. Angles   sharp. Bones without acute abnormality.    Please refer to CT angiography of the same day for additional information    < end of copied text >    DIAGNOSTIC TESTING:    [x] Echocardiogram: < from: TTE Echo Complete w/o Contrast w/ Doppler (10.01.24 @ 11:49) >   1. Left ventricular systolic function is normal with an ejection   fraction of 61 % by Faustin's method of disks.   2. There is mild (grade 1) left ventricular diastolic dysfunction.   3. Left atrium is severely dilated.   4. Estimated pulmonary artery systolic pressure is 14 mmHg, consistent   with normal pulmonary artery pressure.   5. Mild aortic regurgitation.   6. Severe left ventricular hypertrophy.   7. Technically difficult image quality.   8. There is moderate calcification of the mitral valve annulus.   9. Trileaflet aortic valve with normal systolic excursion. There is   moderate calcification of the aortic valve leaflets.    < end of copied text >    < from: TTE Echo Complete w/o Contrast w/ Doppler (08.05.23 @ 10:57) >  1. Left ventricular ejection fraction, by visual estimation, is 50 to   55%.   2. Technically limited study.   3. Normal global left ventricular systolic function.   4. Spectral Doppler shows pseudonormal pattern of left ventricular   myocardial filling (Grade II diastolic dysfunction).   5. Global longitudinal strain using Meghan Epic CVx software is -11.2%   at a HR of 70bpm and /71.   6. Mildly enlarged left atrium.   7. Mildly enlarged right atrium.   8. Mild mitral valve regurgitation.   9. Mild thickening of the anterior and posterior mitral valve leaflets.  10. Moderate mitral annular calcification.  11. Moderate tricuspid regurgitation.  12. Mild aortic regurgitation.    < end of copied text >    LABS:	 	    02 Oct 2024 07:56    140    |  102    |  33     ----------------------------<  101    3.9     |  31     |  0.91   01 Oct 2024 08:13    140    |  103    |  30     ----------------------------<  107    3.9     |  30     |  1.01   30 Sep 2024 07:36    140    |  100    |  28     ----------------------------<  102    3.1     |  32     |  1.14     Ca    9.2        02 Oct 2024 07:56  Phos  3.5       02 Oct 2024 07:56  Mg     2.3       02 Oct 2024 07:56    TPro  7.4    /  Alb  3.5    /  TBili  0.6    /  DBili  x      /  AST  17     /  ALT  22     /  AlkPhos  89     02 Oct 2024 07:56                        13.7   8.24  )-----------( 243      ( 02 Oct 2024 07:56 )             43.7 ,                       13.1   8.32  )-----------( 238      ( 01 Oct 2024 08:13 )             41.9 ,                       13.5   7.44  )-----------( 241      ( 30 Sep 2024 07:36 )             42.8   pro-BNP: Pro-Brain Natriuretic Peptide: 1760 pg/mL (09.29.24 @ 05:20)               Patient is a 89y old  Female who presents with a chief complaint of sob and LE edema     PAST MEDICAL & SURGICAL HISTORY:  HTN (hypertension)    SVT    CHF    Hyperlipidemia    S/P lumpectomy, left breast    INTERVAL HISTORY: in no acute distress, s/p RRT this am for chest tightness/HTN/wheezing   	  MEDICATIONS:  MEDICATIONS  (STANDING):  atorvastatin 10 milliGRAM(s) Oral at bedtime  enoxaparin Injectable 40 milliGRAM(s) SubCutaneous every 24 hours  furosemide   Injectable 40 milliGRAM(s) IV Push daily  metoprolol succinate ER 50 milliGRAM(s) Oral daily  NIFEdipine XL 30 milliGRAM(s) Oral daily    MEDICATIONS  (PRN):  acetaminophen     Tablet .. 650 milliGRAM(s) Oral every 6 hours PRN Temp greater or equal to 38C (100.4F), Mild Pain (1 - 3)  albuterol/ipratropium for Nebulization 3 milliLiter(s) Nebulizer every 6 hours PRN Shortness of Breath and/or Wheezing  aluminum hydroxide/magnesium hydroxide/simethicone Suspension 30 milliLiter(s) Oral every 4 hours PRN Dyspepsia  melatonin 3 milliGRAM(s) Oral at bedtime PRN Insomnia  nitroglycerin     SubLingual 0.4 milliGRAM(s) SubLingual every 5 minutes PRN Chest Pain  ondansetron Injectable 4 milliGRAM(s) IV Push every 8 hours PRN Nausea and/or Vomiting  QUEtiapine 12.5 milliGRAM(s) Oral every 6 hours PRN for agitation    Vitals:  T(F): 97.5 (10-02-24 @ 05:05), Max: 98.3 (10-01-24 @ 16:25)  HR: 72 (10-02-24 @ 05:05) (72 - 89)  BP: 132/92 (10-02-24 @ 05:05) (127/75 - 176/86)  RR: 20 (10-02-24 @ 05:05) (18 - 20)  SpO2: 99% (10-02-24 @ 05:05) (95% - 100%)    10-01 @ 07:01  -  10-02 @ 07:00  --------------------------------------------------------  IN:  Total IN: 0 mL    OUT:    Voided (mL): 900 mL  Total OUT: 900 mL    Total NET: -900 mL    10-02 @ 07:01  -  10-02 @ 10:16  --------------------------------------------------------  IN:  Total IN: 0 mL    OUT:    Voided (mL): 850 mL  Total OUT: 850 mL    Total NET: -850 mL    PHYSICAL EXAM:  Neuro: Awake, responsive  CV: S1 S2 RRR, +SM  Lungs: diminished to bases   GI: Soft, BS +, ND, NT  Extremities: Trace LE edema    TELEMETRY: sinus, few PVCs    RADIOLOGY: < from: CT Angio Chest PE Protocol w/ IV Cont (09.29.24 @ 10:06) >  Limited PE study for evaluation of the subsegmental pulmonary   arterial branches. No pulmonary arterial emboli within the other well   visualized pulmonary arteries.    Cardiomegaly with dilation of the left atrium with suggestion of left   ventricular hypertrophy.    Right lower lobe tubular opacity suggestive of an impacted distal or   dilated airway.    < end of copied text >    < from: Xray Chest 1 View- PORTABLE-Urgent (Xray Chest 1 View- PORTABLE-Urgent .) (09.29.24 @ 06:59) >  Mild cardiomegaly is suspected. Mediastinum unremarkable.   Lungs clear with diffuse coarsened lung markings again seen. Angles   sharp. Bones without acute abnormality.    Please refer to CT angiography of the same day for additional information    < end of copied text >    DIAGNOSTIC TESTING:    [x] Echocardiogram: < from: TTE Echo Complete w/o Contrast w/ Doppler (10.01.24 @ 11:49) >   1. Left ventricular systolic function is normal with an ejection   fraction of 61 % by Faustin's method of disks.   2. There is mild (grade 1) left ventricular diastolic dysfunction.   3. Left atrium is severely dilated.   4. Estimated pulmonary artery systolic pressure is 14 mmHg, consistent   with normal pulmonary artery pressure.   5. Mild aortic regurgitation.   6. Severe left ventricular hypertrophy.   7. Technically difficult image quality.   8. There is moderate calcification of the mitral valve annulus.   9. Trileaflet aortic valve with normal systolic excursion. There is   moderate calcification of the aortic valve leaflets.    < end of copied text >    < from: TTE Echo Complete w/o Contrast w/ Doppler (08.05.23 @ 10:57) >  1. Left ventricular ejection fraction, by visual estimation, is 50 to   55%.   2. Technically limited study.   3. Normal global left ventricular systolic function.   4. Spectral Doppler shows pseudonormal pattern of left ventricular   myocardial filling (Grade II diastolic dysfunction).   5. Global longitudinal strain using Meghan Epic CVx software is -11.2%   at a HR of 70bpm and /71.   6. Mildly enlarged left atrium.   7. Mildly enlarged right atrium.   8. Mild mitral valve regurgitation.   9. Mild thickening of the anterior and posterior mitral valve leaflets.  10. Moderate mitral annular calcification.  11. Moderate tricuspid regurgitation.  12. Mild aortic regurgitation.    < end of copied text >    LABS:	 	    02 Oct 2024 07:56    140    |  102    |  33     ----------------------------<  101    3.9     |  31     |  0.91   01 Oct 2024 08:13    140    |  103    |  30     ----------------------------<  107    3.9     |  30     |  1.01   30 Sep 2024 07:36    140    |  100    |  28     ----------------------------<  102    3.1     |  32     |  1.14     Ca    9.2        02 Oct 2024 07:56  Phos  3.5       02 Oct 2024 07:56  Mg     2.3       02 Oct 2024 07:56    TPro  7.4    /  Alb  3.5    /  TBili  0.6    /  DBili  x      /  AST  17     /  ALT  22     /  AlkPhos  89     02 Oct 2024 07:56                        13.7   8.24  )-----------( 243      ( 02 Oct 2024 07:56 )             43.7 ,                       13.1   8.32  )-----------( 238      ( 01 Oct 2024 08:13 )             41.9 ,                       13.5   7.44  )-----------( 241      ( 30 Sep 2024 07:36 )             42.8   pro-BNP: Pro-Brain Natriuretic Peptide: 1760 pg/mL (09.29.24 @ 05:20)

## 2024-10-02 NOTE — PHYSICAL THERAPY INITIAL EVALUATION ADULT - GAIT TRAINING, PT EVAL
To be able to perform ambulation independently using rolling walker for 100 feet using proper technique using AD, with proper posture and functional distance at home in 2-4 weeks.
200 feet for functional outdoor mobility c appropriate mobility device, good balance and endurance.

## 2024-10-02 NOTE — RAPID RESPONSE TEAM SUMMARY - NSOTHERINTERVENTIONSRRT_GEN_ALL_CORE
Repeat EKG with no ischemic changes, unchanged from admission   CXR ordered to r/o acute pulmonary edema   Cardiac enzymes   Placed on telemetry     F/U vitals with improved BP 120s/80s and pt now with sx improvement s/p nitro and duoneb     Critical Care time: 45 mins assessing presenting problems of acute illness that poses high probability of life threatening deterioration or end organ damage/dysfunction.  Medical decision making including Initiating plan of care, reviewing data, reviewing radiology, discussing with multidisciplinary team, non inclusive of procedures, discussing goals of care with patient/family

## 2024-10-02 NOTE — PHYSICAL THERAPY INITIAL EVALUATION ADULT - TRANSFER TRAINING, PT EVAL
In 4 weeks, patient will demonstrate independent and safe transfers on and off lying and sitting surfaces without assistance
In 4 weeks, patient will demonstrate independent and safe transfers on and off lying and sitting surfaces without assistance

## 2024-10-02 NOTE — PROGRESS NOTE ADULT - SUBJECTIVE AND OBJECTIVE BOX
Patient is a 89y old  Female who presents with a chief complaint of HTN urgency , ?CHF exacerbation (02 Oct 2024 10:14)    INTERVAL HPI/OVERNIGHT EVENTS: Patients seen and examined at bedside this morning. No acute events overnight. Pt reports chest pain now resolved. Denies SOB.     MEDICATIONS  (STANDING):  atorvastatin 10 milliGRAM(s) Oral at bedtime  enoxaparin Injectable 40 milliGRAM(s) SubCutaneous every 24 hours  furosemide   Injectable 40 milliGRAM(s) IV Push daily  influenza  Vaccine (HIGH DOSE) 0.5 milliLiter(s) IntraMuscular once  ketorolac   Injectable 15 milliGRAM(s) IV Push once  metoprolol succinate ER 50 milliGRAM(s) Oral daily  NIFEdipine XL 30 milliGRAM(s) Oral daily    MEDICATIONS  (PRN):  acetaminophen     Tablet .. 650 milliGRAM(s) Oral every 6 hours PRN Temp greater or equal to 38C (100.4F), Mild Pain (1 - 3)  albuterol/ipratropium for Nebulization 3 milliLiter(s) Nebulizer every 6 hours PRN Shortness of Breath and/or Wheezing  aluminum hydroxide/magnesium hydroxide/simethicone Suspension 30 milliLiter(s) Oral every 4 hours PRN Dyspepsia  melatonin 3 milliGRAM(s) Oral at bedtime PRN Insomnia  nitroglycerin     SubLingual 0.4 milliGRAM(s) SubLingual every 5 minutes PRN Chest Pain  ondansetron Injectable 4 milliGRAM(s) IV Push every 8 hours PRN Nausea and/or Vomiting  QUEtiapine 12.5 milliGRAM(s) Oral every 6 hours PRN for agitation    Allergies    No Known Allergies    Intolerances      REVIEW OF SYSTEMS:  All other systems reviewed and are negative    Vital Signs Last 24 Hrs  T(C): 37.6 (02 Oct 2024 10:15), Max: 37.6 (02 Oct 2024 10:15)  T(F): 99.6 (02 Oct 2024 10:15), Max: 99.6 (02 Oct 2024 10:15)  HR: 84 (02 Oct 2024 10:15) (72 - 89)  BP: 156/88 (02 Oct 2024 10:15) (132/92 - 176/86)  BP(mean): 110 (02 Oct 2024 10:15) (110 - 110)  RR: 18 (02 Oct 2024 10:15) (18 - 20)  SpO2: 95% (02 Oct 2024 10:15) (95% - 99%)    Parameters below as of 02 Oct 2024 10:15  Patient On (Oxygen Delivery Method): nasal cannula      Daily     Daily   I&O's Summary    01 Oct 2024 07:01  -  02 Oct 2024 07:00  --------------------------------------------------------  IN: 0 mL / OUT: 900 mL / NET: -900 mL    02 Oct 2024 07:01  -  02 Oct 2024 13:23  --------------------------------------------------------  IN: 177 mL / OUT: 850 mL / NET: -673 mL      CAPILLARY BLOOD GLUCOSE      POCT Blood Glucose.: 97 mg/dL (02 Oct 2024 07:48)    PHYSICAL EXAM:  GENERAL: NAD, well-groomed, well-developed  HEAD:  Atraumatic, Normocephalic  EYES: EOMI, PERRLA, conjunctiva and sclera clear  ENMT: No tonsillar erythema, exudates, or enlargement; Moist mucous membranes, Good dentition, No lesions  NECK: Supple, No JVD, Normal thyroid  NERVOUS SYSTEM:  Alert & Oriented X3, Good concentration; Motor Strength 5/5 B/L upper and lower extremities; DTRs 2+ intact and symmetric  CHEST/LUNG: Clear to ascultation  bilaterally; No rales, rhonchi, wheezing, or rubs  HEART: Regular rate and rhythm; No murmurs, rubs, or gallops  ABDOMEN: Soft, Nontender, Nondistended; Bowel sounds present  EXTREMITIES:  2+ Peripheral Pulses, No clubbing, cyanosis,  Edema lower extremity   LYMPH: No lymphadenopathy noted  SKIN: No rashes or lesions      Labs                          13.7   8.24  )-----------( 243      ( 02 Oct 2024 07:56 )             43.7     10-02    140  |  102  |  33[H]  ----------------------------<  101[H]  3.9   |  31  |  0.91    Ca    9.2      02 Oct 2024 07:56  Phos  3.5     10-02  Mg     2.3     10-02    TPro  7.4  /  Alb  3.5  /  TBili  0.6  /  DBili  x   /  AST  17  /  ALT  22  /  AlkPhos  89  10-02          Urinalysis Basic - ( 02 Oct 2024 07:56 )    Color: x / Appearance: x / SG: x / pH: x  Gluc: 101 mg/dL / Ketone: x  / Bili: x / Urobili: x   Blood: x / Protein: x / Nitrite: x   Leuk Esterase: x / RBC: x / WBC x   Sq Epi: x / Non Sq Epi: x / Bacteria: x                      Radiology and Imaging reviewed.

## 2024-10-02 NOTE — PHYSICAL THERAPY INITIAL EVALUATION ADULT - ADDITIONAL COMMENTS
pt lives in the [facility] where she was independent with use of rolling walker.
pt encountered in bed supine, aaox3, pt require cg for sit to stand, able to amb with RW x 100 ft, presents with NBOS and short steps, educated on proper gait and clement admitted from assisted living sec to SOB, PLOf is indep in transfers and ambulation with RW ad-lid, pt has assistance with some ADL at the facility, pt o2 is 93% post ambulation on room air, + mild right hand tremors and wheezing noted. pt assisted to chair and leftr comfortably.

## 2024-10-02 NOTE — RAPID RESPONSE TEAM SUMMARY - NSADDTLFINDINGSRRT_GEN_ALL_CORE
Physical Exam  General: anxious appearing, in acute distress   Head: Atraumatic, normocephalic   Eyes: PERRL, conjunctiva clear, sclera non-icteric   ENMT: Moist mucous membranes, nares patent  Chest/Lungs: + labored breathing, mild respiratory distress, + wheezing.   Heart: RRR, No murmur   Abd: Soft, nontender nondistended, BS present. No rebound tenderness or guarding  Extremities: Normal pulses,  No edema, normal capillary refill   Skin: warm, dry, appropriate for ethnicity. Nailbeds pink with no cyanosis or clubbing   Neuro: A&O x 4, no focal neuro deficits

## 2024-10-03 VITALS
HEART RATE: 88 BPM | RESPIRATION RATE: 20 BRPM | OXYGEN SATURATION: 95 % | DIASTOLIC BLOOD PRESSURE: 77 MMHG | TEMPERATURE: 98 F | SYSTOLIC BLOOD PRESSURE: 162 MMHG

## 2024-10-03 LAB
ANION GAP SERPL CALC-SCNC: 6 MMOL/L — SIGNIFICANT CHANGE UP (ref 5–17)
BUN SERPL-MCNC: 34 MG/DL — HIGH (ref 7–23)
CALCIUM SERPL-MCNC: 9.3 MG/DL — SIGNIFICANT CHANGE UP (ref 8.5–10.1)
CHLORIDE SERPL-SCNC: 104 MMOL/L — SIGNIFICANT CHANGE UP (ref 96–108)
CO2 SERPL-SCNC: 29 MMOL/L — SIGNIFICANT CHANGE UP (ref 22–31)
CREAT SERPL-MCNC: 0.96 MG/DL — SIGNIFICANT CHANGE UP (ref 0.5–1.3)
EGFR: 55 ML/MIN/1.73M2 — LOW
GLUCOSE SERPL-MCNC: 132 MG/DL — HIGH (ref 70–99)
POTASSIUM SERPL-MCNC: 3.9 MMOL/L — SIGNIFICANT CHANGE UP (ref 3.5–5.3)
POTASSIUM SERPL-SCNC: 3.9 MMOL/L — SIGNIFICANT CHANGE UP (ref 3.5–5.3)
SODIUM SERPL-SCNC: 139 MMOL/L — SIGNIFICANT CHANGE UP (ref 135–145)

## 2024-10-03 PROCEDURE — 99233 SBSQ HOSP IP/OBS HIGH 50: CPT | Mod: FS

## 2024-10-03 PROCEDURE — 99239 HOSP IP/OBS DSCHRG MGMT >30: CPT

## 2024-10-03 RX ORDER — FUROSEMIDE 10 MG/ML
1 INJECTION INTRAVENOUS
Qty: 30 | Refills: 0
Start: 2024-10-03 | End: 2024-11-01

## 2024-10-03 RX ORDER — METOPROLOL TARTRATE 50 MG
1 TABLET ORAL
Qty: 30 | Refills: 0
Start: 2024-10-03 | End: 2024-11-01

## 2024-10-03 RX ADMIN — Medication 30 MILLIGRAM(S): at 06:22

## 2024-10-03 RX ADMIN — FUROSEMIDE 40 MILLIGRAM(S): 10 INJECTION INTRAVENOUS at 06:22

## 2024-10-03 RX ADMIN — ATORVASTATIN CALCIUM 10 MILLIGRAM(S): 10 TABLET, FILM COATED ORAL at 21:07

## 2024-10-03 RX ADMIN — Medication 50 MILLIGRAM(S): at 06:22

## 2024-10-03 RX ADMIN — ENOXAPARIN SODIUM 40 MILLIGRAM(S): 150 INJECTION SUBCUTANEOUS at 17:13

## 2024-10-03 RX ADMIN — IPRATROPIUM BROMIDE AND ALBUTEROL SULFATE 3 MILLILITER(S): .5; 3 SOLUTION RESPIRATORY (INHALATION) at 19:54

## 2024-10-03 NOTE — DISCHARGE NOTE PROVIDER - NSDCMRMEDTOKEN_GEN_ALL_CORE_FT
Albuterol (Eqv-ProAir HFA) 90 mcg/inh inhalation aerosol: 2 puff(s) inhaled every 6 hours as needed for  bronchospasm  atorvastatin 10 mg oral tablet: 1 tab(s) orally once a day  QUEtiapine 25 mg oral tablet: 0.5 tab(s) orally every 6 hours as needed for  agitation   Albuterol (Eqv-ProAir HFA) 90 mcg/inh inhalation aerosol: 2 puff(s) inhaled every 6 hours as needed for  bronchospasm  atorvastatin 10 mg oral tablet: 1 tab(s) orally once a day  furosemide 40 mg oral tablet: 1 tab(s) orally once a day  metoprolol succinate 50 mg oral tablet, extended release: 1 tab(s) orally once a day  NIFEdipine 30 mg oral tablet, extended release: 1 tab(s) orally once a day  QUEtiapine 25 mg oral tablet: 0.5 tab(s) orally every 6 hours as needed for  agitation

## 2024-10-03 NOTE — DISCHARGE NOTE PROVIDER - HOSPITAL COURSE
Ms. Vargas is a 89 year old [place], with a PMH of HTN, HLD, CHFpEF, SVT presents to ED w/ shortness of breath when lying down for over a week, noted to have elevated SBP in 200. Admitted for HTN urgency and CHF exacerbation now stable for discharge home.    A/P:  #HTN urgency   #Acute on chronic diastolic HF exacerbation  #Hypokalemia   #Cardiomegaly w/ left ventricular hypertrophy    #HLD  #DVT ppx     Plan:  -Patient p/w orthopnea, trace pedal edema, no significant pulm edema on imaging.   - Symptoms could be secondary to mild pulm edema from HTN urgency vs CHF exacerbation.   -Improved s/p Lasix.   -Appreciate cardio recs, c/w IV Lasix,  monitor I&O  -Follow ECHO see above     -Replete K   -BP stable, c/w metoprolol and nifedipine  -PT eval: home PT  monitor for next 24 hours.   cardiology follow up

## 2024-10-03 NOTE — DISCHARGE NOTE NURSING/CASE MANAGEMENT/SOCIAL WORK - NSDCPEFALRISK_GEN_ALL_CORE
For information on Fall & Injury Prevention, visit: [email or web address] OR  [email or web address] OR  https://www.cdc.gov/steadi/patient.html

## 2024-10-03 NOTE — PROGRESS NOTE ADULT - NS ATTEND AMEND GEN_ALL_CORE FT
TTE with preserved EF, DD I  remains volume overloaded  cont IV diuresis
TTE with preserved EF, DD I  euvolemic appearing - transition to PO diuretics  BP control
TTE with preserved EF, DD I  remains volume overloaded  cont IV diuresis

## 2024-10-03 NOTE — PROGRESS NOTE ADULT - ASSESSMENT
89F HTN, HLD, HFpEF, SVT who presents with hypertension and CHF exacerbation.  BNP 1760   CT Angio chest- No PE    s/p RRT 10/2/24 for acute resp distress/wheezing/cp/HTN, responded to s/l nitro, Duoneb,  Repeat EKG unchanged from admission, Trop neg  likely flash pulm edema 2/2 high BP    -cont on supplemental o2 as needed  -can transition oral diuresis, ~ euvolemic   -monitor renal function and electrolytes   -cont Toprol 50/d, NIFEdipine XL 30   -TTE with preserved EF, Grade I DD, Left atrium is severely dilated, severe LVH  -optimize BP control, can consider adding  low dose ARB  -outpatient cardiology follow up
Ms. Vargas is a 89 year old female [place], with a PMH of HTN, HLD, CHFpEF, SVT presents to ED w/ shortness of breath when lying down for over a week, noted to have elevated SBP in 200. Admitted for HTN urgency and CHF exacerbation     A/P:  #HTN urgency   #Acute on chronic diastolic HF exacerbation  #Hypokalemia   #Cardiomegaly w/ left ventricular hypertrophy    #HLD  #DVT ppx     Plan:  -Patient p/w orthopnea, trace pedal edema, no significant pulm edema on imaging.   - Symptoms could be secondary to mild pulm edema from HTN urgency vs CHF exacerbation.   -Improved s/p Lasix.   -Appreciate cardio recs, c/w IV Lasix,  monitor I&O  -Follow ECHO    -Replete K   -BP stable, c/w metoprolol and nifedipine  -PT eval requested    
Ms. Vargas is a 89 year old female [place], with a PMH of HTN, HLD, CHFpEF, SVT presents to ED w/ shortness of breath when lying down for over a week, noted to have elevated SBP in 200. Admitted for HTN urgency and CHF exacerbation     A/P:  #HTN urgency   #Acute on chronic diastolic HF exacerbation  #Hypokalemia   #Cardiomegaly w/ left ventricular hypertrophy    #HLD  #DVT ppx     Plan:  -Patient p/w orthopnea, trace pedal edema, no significant pulm edema on imaging.   - Symptoms could be secondary to mild pulm edema from HTN urgency vs CHF exacerbation.   -Improved s/p Lasix.   -Appreciate cardio recs, c/w IV Lasix,  monitor I&O  -Follow ECHO see above     -Replete K   -BP stable, c/w metoprolol and nifedipine  -PT eval requested    
Ms. Vargas is a 89 year old female [place], with a PMH of HTN, HLD, CHFpEF, SVT presents to ED w/ shortness of breath when lying down for over a week, noted to have elevated SBP in 200. Admitted for HTN urgency and CHF exacerbation now stable.    A/P:  #HTN urgency   #Acute on chronic diastolic HF exacerbation  #Hypokalemia   #Cardiomegaly w/ left ventricular hypertrophy    #HLD  #DVT ppx     Plan:  -Patient p/w orthopnea, trace pedal edema, no significant pulm edema on imaging.   - Symptoms could be secondary to mild pulm edema from HTN urgency vs CHF exacerbation.   -Improved s/p Lasix.   -Appreciate cardio recs, c/w IV Lasix,  monitor I&O  -Follow ECHO see above     -Replete K   -BP stable, c/w metoprolol and nifedipine  -PT eval: home PT  monitor for next 24 hours.   cardiology follow up  if clinically stable will discharge aris.  
89F HTN, HLD, HFpEF, SVT who presents with hypertension and CHF exacerbation.  BNP 1760   CT Angio chest- No PE    -cont IV diuresis  -cont Toprol 50/d, NIFEdipine XL 30   -TTE pending 
89F HTN, HLD, HFpEF, SVT who presents with hypertension and CHF exacerbation.  BNP 1760   CT Angio chest- No PE    s/p RRT this am for acute resp distress/wheezing/cp/HTN, responded to s/l nitro, Duoneb,  Repeat EKG unchanged from admission, Trop neg  likely flash pulm edema 2/2 high BP    -cont on supplemental o2 as needed  -cont IV diuresis, can be transitioned to oral soon as hypervolemia now improved   -monitor renal function and electrolytes   -cont Toprol 50/d, NIFEdipine XL 30   -TTE with preserved EF, Grade I DD, Left atrium is severely dilated, severe LVH  -optimize BP control, would consider adding  low dose ARB

## 2024-10-03 NOTE — PROGRESS NOTE ADULT - PROVIDER SPECIALTY LIST ADULT
Cardiology
Hospitalist
no lesions,  no deformities, no masses present, breathing is unlabored without accessory muscle use, normal breath sounds
Cardiology
Hospitalist
Cardiology
Internal Medicine

## 2024-10-03 NOTE — DISCHARGE NOTE PROVIDER - ATTENDING DISCHARGE PHYSICAL EXAMINATION:
GENERAL: NAD, well-groomed, well-developed  HEAD:  Atraumatic, Normocephalic  EYES: EOMI, PERRLA, conjunctiva and sclera clear  ENMT: No tonsillar erythema, exudates, or enlargement; Moist mucous membranes, Good dentition, No lesions  NECK: Supple, No JVD, Normal thyroid  NERVOUS SYSTEM:  Alert & Oriented X3, Good concentration; Motor Strength 5/5 B/L upper and lower extremities; DTRs 2+ intact and symmetric  CHEST/LUNG: Clear to ascultation  bilaterally; No rales, rhonchi, wheezing, or rubs  HEART: Regular rate and rhythm; No murmurs, rubs, or gallops  ABDOMEN: Soft, Nontender, Nondistended; Bowel sounds present  EXTREMITIES:  2+ Peripheral Pulses, No clubbing, cyanosis,  Edema lower extremity   LYMPH: No lymphadenopathy noted  SKIN: No rashes or lesions

## 2024-10-03 NOTE — DISCHARGE NOTE NURSING/CASE MANAGEMENT/SOCIAL WORK - PATIENT PORTAL LINK FT
You can access the FollowMyHealth Patient Portal offered by [facility] by registering at the following website: [email or web address]. By joining [employer]’s FollowMyHealth portal, you will also be able to view your health information using other applications (apps) compatible with our system.

## 2024-10-03 NOTE — PROGRESS NOTE ADULT - REASON FOR ADMISSION
HTN urgency , ?CHF exacerbation

## 2024-10-03 NOTE — PROGRESS NOTE ADULT - SUBJECTIVE AND OBJECTIVE BOX
Patient is a 89y old  Female who presents with a chief complaint of sob and LE edema     PAST MEDICAL & SURGICAL HISTORY:  HTN (hypertension)    SVT    CHF    Hyperlipidemia    S/P lumpectomy, left breast    INTERVAL HISTORY: in no acute distress, resting in chair   	  MEDICATIONS:  MEDICATIONS  (STANDING):  atorvastatin 10 milliGRAM(s) Oral at bedtime  enoxaparin Injectable 40 milliGRAM(s) SubCutaneous every 24 hours  furosemide   Injectable 40 milliGRAM(s) IV Push daily  influenza  Vaccine (HIGH DOSE) 0.5 milliLiter(s) IntraMuscular once  ketorolac   Injectable 15 milliGRAM(s) IV Push once  metoprolol succinate ER 50 milliGRAM(s) Oral daily  NIFEdipine XL 30 milliGRAM(s) Oral daily    MEDICATIONS  (PRN):  acetaminophen     Tablet .. 650 milliGRAM(s) Oral every 6 hours PRN Temp greater or equal to 38C (100.4F), Mild Pain (1 - 3)  albuterol/ipratropium for Nebulization 3 milliLiter(s) Nebulizer every 6 hours PRN Shortness of Breath and/or Wheezing  aluminum hydroxide/magnesium hydroxide/simethicone Suspension 30 milliLiter(s) Oral every 4 hours PRN Dyspepsia  melatonin 3 milliGRAM(s) Oral at bedtime PRN Insomnia  nitroglycerin     SubLingual 0.4 milliGRAM(s) SubLingual every 5 minutes PRN Chest Pain  ondansetron Injectable 4 milliGRAM(s) IV Push every 8 hours PRN Nausea and/or Vomiting  QUEtiapine 12.5 milliGRAM(s) Oral every 6 hours PRN for agitation    Vitals:  T(F): 97.6 (10-03-24 @ 10:36), Max: 98.2 (10-02-24 @ 16:28)  HR: 78 (10-03-24 @ 10:36) (72 - 90)  BP: 126/79 (10-03-24 @ 10:36) (126/79 - 161/90)  RR: 21 (10-03-24 @ 10:36) (20 - 21)  SpO2: 93% (10-03-24 @ 10:36) (93% - 100%)    10-02 @ 07:01  -  10-03 @ 07:00  --------------------------------------------------------  IN:    Oral Fluid: 413 mL  Total IN: 413 mL    OUT:    Voided (mL): 1250 mL  Total OUT: 1250 mL    Total NET: -837 mL    10-03 @ 07:01  -  10-03 @ 13:55  --------------------------------------------------------  IN:    Oral Fluid: 720 mL  Total IN: 720 mL    OUT:    Voided (mL): 700 mL  Total OUT: 700 mL    Total NET: 20 mL    PHYSICAL EXAM:  Neuro: Awake, responsive  CV: S1 S2 RRR  Lungs: diminished to bases   GI: Soft, BS +, ND, NT  Extremities: Trace LE edema    TELEMETRY: sinus    RADIOLOGY: < from: Xray Chest 1 View- PORTABLE-Urgent (Xray Chest 1 View- PORTABLE-Urgent .) (10.02.24 @ 14:36) >  IMPRESSION: Stable     < end of copied text >  < from: CT Angio Chest PE Protocol w/ IV Cont (09.29.24 @ 10:06) >  IMPRESSION: Limited PE study for evaluation of the subsegmental pulmonary   arterial branches. No pulmonary arterial emboli within the other well   visualized pulmonary arteries.    Cardiomegaly with dilation of the left atrium with suggestion of left   ventricular hypertrophy.    Right lower lobe tubular opacity suggestive of an impacted distal or   dilated airway.    < end of copied text >    DIAGNOSTIC TESTING:    [x ] Echocardiogram: < from: TTE Echo Complete w/o Contrast w/ Doppler (10.01.24 @ 11:49) >   1. Left ventricular systolic function is normal with an ejection   fraction of 61 % by Faustin's method of disks.   2. There is mild (grade 1) left ventricular diastolic dysfunction.   3. Left atrium is severely dilated.   4. Estimated pulmonary artery systolic pressure is 14 mmHg, consistent   with normal pulmonary artery pressure.   5. Mild aortic regurgitation.   6. Severe left ventricular hypertrophy.   7. Technically difficult image quality.   8. There is moderate calcification of the mitral valve annulus.   9. Trileaflet aortic valve with normal systolic excursion. There is   moderate calcification of the aortic valve leaflets.    < end of copied text >    LABS:	 	    CARDIAC MARKERS:  Troponin I, High Sensitivity Result: 28.6 ng/L (10-02 @ 07:56)    03 Oct 2024 06:55    139    |  104    |  34     ----------------------------<  132    3.9     |  29     |  0.96   02 Oct 2024 07:56    140    |  102    |  33     ----------------------------<  101    3.9     |  31     |  0.91   01 Oct 2024 08:13    140    |  103    |  30     ----------------------------<  107    3.9     |  30     |  1.01     Ca    9.3        03 Oct 2024 06:55  Phos  3.5       02 Oct 2024 07:56  Mg     2.3       02 Oct 2024 07:56    TPro  7.4    /  Alb  3.5    /  TBili  0.6    /  DBili  x      /  AST  17     /  ALT  22     /  AlkPhos  89     02 Oct 2024 07:56                        13.7   8.24  )-----------( 243      ( 02 Oct 2024 07:56 )             43.7 ,                       13.1   8.32  )-----------( 238      ( 01 Oct 2024 08:13 )             41.9   pro-BNP: Pro-Brain Natriuretic Peptide: 1760 pg/mL (09.29.24 @ 05:20)

## 2024-10-03 NOTE — DISCHARGE NOTE PROVIDER - NSDCCPCAREPLAN_GEN_ALL_CORE_FT
PRINCIPAL DISCHARGE DIAGNOSIS  Diagnosis: Shortness of breath  Assessment and Plan of Treatment:

## 2024-10-04 ENCOUNTER — EMERGENCY (EMERGENCY)
Facility: HOSPITAL | Age: 89
LOS: 0 days | Discharge: HOME | End: 2024-10-04
Attending: EMERGENCY MEDICINE
Payer: MEDICARE

## 2024-10-04 VITALS
SYSTOLIC BLOOD PRESSURE: 155 MMHG | DIASTOLIC BLOOD PRESSURE: 90 MMHG | OXYGEN SATURATION: 99 % | HEART RATE: 98 BPM | RESPIRATION RATE: 23 BRPM

## 2024-10-04 VITALS
SYSTOLIC BLOOD PRESSURE: 173 MMHG | RESPIRATION RATE: 18 BRPM | HEIGHT: 62 IN | WEIGHT: 126.99 LBS | OXYGEN SATURATION: 95 % | HEART RATE: 82 BPM | TEMPERATURE: 98 F | DIASTOLIC BLOOD PRESSURE: 94 MMHG

## 2024-10-04 DIAGNOSIS — R53.1 WEAKNESS: ICD-10-CM

## 2024-10-04 DIAGNOSIS — I16.0 HYPERTENSIVE URGENCY: ICD-10-CM

## 2024-10-04 DIAGNOSIS — I50.32 CHRONIC DIASTOLIC (CONGESTIVE) HEART FAILURE: ICD-10-CM

## 2024-10-04 DIAGNOSIS — Z98.890 OTHER SPECIFIED POSTPROCEDURAL STATES: Chronic | ICD-10-CM

## 2024-10-04 DIAGNOSIS — E78.5 HYPERLIPIDEMIA, UNSPECIFIED: ICD-10-CM

## 2024-10-04 DIAGNOSIS — I11.0 HYPERTENSIVE HEART DISEASE WITH HEART FAILURE: ICD-10-CM

## 2024-10-04 DIAGNOSIS — R05.9 COUGH, UNSPECIFIED: ICD-10-CM

## 2024-10-04 LAB
ALBUMIN SERPL ELPH-MCNC: 3.3 G/DL — SIGNIFICANT CHANGE UP (ref 3.3–5)
ALP SERPL-CCNC: 86 U/L — SIGNIFICANT CHANGE UP (ref 40–120)
ALT FLD-CCNC: 21 U/L — SIGNIFICANT CHANGE UP (ref 12–78)
ANION GAP SERPL CALC-SCNC: 3 MMOL/L — LOW (ref 5–17)
APPEARANCE UR: CLEAR — SIGNIFICANT CHANGE UP
AST SERPL-CCNC: 25 U/L — SIGNIFICANT CHANGE UP (ref 15–37)
BASOPHILS # BLD AUTO: 0.07 K/UL — SIGNIFICANT CHANGE UP (ref 0–0.2)
BASOPHILS NFR BLD AUTO: 0.8 % — SIGNIFICANT CHANGE UP (ref 0–2)
BILIRUB SERPL-MCNC: 0.5 MG/DL — SIGNIFICANT CHANGE UP (ref 0.2–1.2)
BILIRUB UR-MCNC: NEGATIVE — SIGNIFICANT CHANGE UP
BUN SERPL-MCNC: 37 MG/DL — HIGH (ref 7–23)
CALCIUM SERPL-MCNC: 9.5 MG/DL — SIGNIFICANT CHANGE UP (ref 8.5–10.1)
CHLORIDE SERPL-SCNC: 104 MMOL/L — SIGNIFICANT CHANGE UP (ref 96–108)
CO2 SERPL-SCNC: 30 MMOL/L — SIGNIFICANT CHANGE UP (ref 22–31)
COLOR SPEC: YELLOW — SIGNIFICANT CHANGE UP
CREAT SERPL-MCNC: 0.9 MG/DL — SIGNIFICANT CHANGE UP (ref 0.5–1.3)
DIFF PNL FLD: NEGATIVE — SIGNIFICANT CHANGE UP
EGFR: 60 ML/MIN/1.73M2 — SIGNIFICANT CHANGE UP
EOSINOPHIL # BLD AUTO: 0.26 K/UL — SIGNIFICANT CHANGE UP (ref 0–0.5)
EOSINOPHIL NFR BLD AUTO: 3 % — SIGNIFICANT CHANGE UP (ref 0–6)
FLUAV AG NPH QL: SIGNIFICANT CHANGE UP
FLUBV AG NPH QL: SIGNIFICANT CHANGE UP
GLUCOSE SERPL-MCNC: 108 MG/DL — HIGH (ref 70–99)
GLUCOSE UR QL: NEGATIVE MG/DL — SIGNIFICANT CHANGE UP
HCT VFR BLD CALC: 42.6 % — SIGNIFICANT CHANGE UP (ref 34.5–45)
HGB BLD-MCNC: 13.6 G/DL — SIGNIFICANT CHANGE UP (ref 11.5–15.5)
IMM GRANULOCYTES NFR BLD AUTO: 0.2 % — SIGNIFICANT CHANGE UP (ref 0–0.9)
KETONES UR-MCNC: NEGATIVE MG/DL — SIGNIFICANT CHANGE UP
LEUKOCYTE ESTERASE UR-ACNC: NEGATIVE — SIGNIFICANT CHANGE UP
LYMPHOCYTES # BLD AUTO: 1.16 K/UL — SIGNIFICANT CHANGE UP (ref 1–3.3)
LYMPHOCYTES # BLD AUTO: 13.2 % — SIGNIFICANT CHANGE UP (ref 13–44)
MCHC RBC-ENTMCNC: 29.4 PG — SIGNIFICANT CHANGE UP (ref 27–34)
MCHC RBC-ENTMCNC: 31.9 G/DL — LOW (ref 32–36)
MCV RBC AUTO: 92.2 FL — SIGNIFICANT CHANGE UP (ref 80–100)
MONOCYTES # BLD AUTO: 0.7 K/UL — SIGNIFICANT CHANGE UP (ref 0–0.9)
MONOCYTES NFR BLD AUTO: 8 % — SIGNIFICANT CHANGE UP (ref 2–14)
NEUTROPHILS # BLD AUTO: 6.55 K/UL — SIGNIFICANT CHANGE UP (ref 1.8–7.4)
NEUTROPHILS NFR BLD AUTO: 74.8 % — SIGNIFICANT CHANGE UP (ref 43–77)
NITRITE UR-MCNC: NEGATIVE — SIGNIFICANT CHANGE UP
NRBC # BLD: 0 /100 WBCS — SIGNIFICANT CHANGE UP (ref 0–0)
NT-PROBNP SERPL-SCNC: 415 PG/ML — SIGNIFICANT CHANGE UP (ref 0–450)
PH UR: 6.5 — SIGNIFICANT CHANGE UP (ref 5–8)
PLATELET # BLD AUTO: 218 K/UL — SIGNIFICANT CHANGE UP (ref 150–400)
POTASSIUM SERPL-MCNC: 4.1 MMOL/L — SIGNIFICANT CHANGE UP (ref 3.5–5.3)
POTASSIUM SERPL-SCNC: 4.1 MMOL/L — SIGNIFICANT CHANGE UP (ref 3.5–5.3)
PROT SERPL-MCNC: 7.1 GM/DL — SIGNIFICANT CHANGE UP (ref 6–8.3)
PROT UR-MCNC: SIGNIFICANT CHANGE UP MG/DL
RBC # BLD: 4.62 M/UL — SIGNIFICANT CHANGE UP (ref 3.8–5.2)
RBC # FLD: 15.4 % — HIGH (ref 10.3–14.5)
SARS-COV-2 RNA SPEC QL NAA+PROBE: SIGNIFICANT CHANGE UP
SODIUM SERPL-SCNC: 137 MMOL/L — SIGNIFICANT CHANGE UP (ref 135–145)
SP GR SPEC: 1.02 — SIGNIFICANT CHANGE UP (ref 1–1.03)
TROPONIN I, HIGH SENSITIVITY RESULT: 30.7 NG/L — SIGNIFICANT CHANGE UP
UROBILINOGEN FLD QL: 0.2 MG/DL — SIGNIFICANT CHANGE UP (ref 0.2–1)
WBC # BLD: 8.76 K/UL — SIGNIFICANT CHANGE UP (ref 3.8–10.5)
WBC # FLD AUTO: 8.76 K/UL — SIGNIFICANT CHANGE UP (ref 3.8–10.5)

## 2024-10-04 PROCEDURE — 71045 X-RAY EXAM CHEST 1 VIEW: CPT | Mod: 26

## 2024-10-04 PROCEDURE — 99285 EMERGENCY DEPT VISIT HI MDM: CPT

## 2024-10-04 RX ORDER — FUROSEMIDE 40 MG
40 TABLET ORAL ONCE
Refills: 0 | Status: COMPLETED | OUTPATIENT
Start: 2024-10-04 | End: 2024-10-04

## 2024-10-04 RX ORDER — HYDRALAZINE HCL 50 MG
5 TABLET ORAL ONCE
Refills: 0 | Status: COMPLETED | OUTPATIENT
Start: 2024-10-04 | End: 2024-10-04

## 2024-10-04 RX ADMIN — Medication 5 MILLIGRAM(S): at 05:43

## 2024-10-04 RX ADMIN — Medication 40 MILLIGRAM(S): at 05:44

## 2024-10-04 NOTE — ED PROVIDER NOTE - CLINICAL SUMMARY MEDICAL DECISION MAKING FREE TEXT BOX
89 yo F with general weakness, clear lungs, most likely htn urgency, doubt acs, also concerning for uti, doubt chf (+pt. denies sob and cough when asked now), doubt viral syndrome  -cbc, cmp, ua/cx, flu/covid, bnp, trop, CXR, ekg, iv, lasix for possible chf component hydralazine for pressure, monitor  -f/u results, reeval

## 2024-10-04 NOTE — ED ADULT NURSE NOTE - OBJECTIVE STATEMENT
89 yo female BIBEMS from [facility] c/o weakness and dry cough. pt aox3, amb at baseline with walker. Pt recently dc from [facility] with for SOB and LE Edema. Pt is also DNR, with paperwork in chart. (-) CP, sob, n/v/d, f/c. BP on arrival 170/90  PMHx HTN

## 2024-10-04 NOTE — ED ADULT NURSE NOTE - CHIEF COMPLAINT QUOTE
Patient was BIB [facility] from [facility] for weakness and cough. Patient was recently discharged from [facility]. Pmh htn. DNR ppwk supplied.

## 2024-10-04 NOTE — ED ADULT NURSE NOTE - NSFALLHARMRISKINTERV_ED_ALL_ED

## 2024-10-04 NOTE — ED ADULT NURSE REASSESSMENT NOTE - NS ED NURSE REASSESS COMMENT FT1
Pt resting on stretcher. Safety maintained. Updates given. Bladder and bowel elimination facilitated. Purewick initiated.
Writer called [facility] to inform them patient has been discharged and will transported back to facility. ASIA CHERRY answered the phone and reports staff will be waiting for patient arrival.
Received report from night RN, patient A&Ox3, disoriented to time, has uncomfortable appearance. Patient reports "I don't feel like myself". Awaiting re-assessment by MD

## 2024-10-04 NOTE — ED PROVIDER NOTE - PATIENT PORTAL LINK FT
18
You can access the FollowMyHealth Patient Portal offered by [facility] by registering at the following website: [email or web address]. By joining [employer]’s FollowMyHealth portal, you will also be able to view your health information using other applications (apps) compatible with our system.

## 2024-10-04 NOTE — ED PROVIDER NOTE - OBJECTIVE STATEMENT
89 yo F sent from [facility] home with cough and general weakness.  Pt. states "I'm not myself."  Pt. does not elaborate further.  She admits to recent admission for her pressure, has nothing else to add.   ROS: negative for fever, headache, chest pain, shortness of breath, abd pain, nausea, vomiting, diarrhea, rash, paresthesia, and focal weakness--all other systems reviewed are negative.   PMH: HTN, HLD, CHFpEF, SVT; Meds: See EMR for list; SH: Denies smoking/drinking/drug use

## 2024-10-04 NOTE — ED ADULT TRIAGE NOTE - CHIEF COMPLAINT QUOTE
Patient was BIB [facility] from NH for weakness and cough. Patient was recently discharged from [facility]. Pmh htn. DNR ppwk supplied.

## 2024-10-04 NOTE — ED ADULT NURSE NOTE - ED STAT RN HANDOFF DETAILS
Handoff given to oncoming RN Rosario. Plan of care reviewed, Pt concerns and pending orders endorsed.

## 2024-10-04 NOTE — ED ADULT TRIAGE NOTE - GLASGOW COMA SCALE: BEST VERBAL RESPONSE, MLM
Refill request for paroxetine received    Refill request denied, patient has not seen Dr Macedo    Patient would need an appt   (V5) oriented

## 2024-10-04 NOTE — ED PROVIDER NOTE - PROGRESS NOTE DETAILS
Patient was signed out to me pending chest x-ray.  X-ray is negative for pneumonia and pneumothoraces per my independent evaluation.  At this time, we believe that the patient is safe for discharge to home.

## 2024-10-04 NOTE — ED PROVIDER NOTE - PHYSICAL EXAMINATION
Vitals: HTN at 173/94, otherwise WNL  Gen: AAOx3, NAD, laying comfortably in stretcher, responsive to questions   Head: ncat, perrla, eomi b/l  Neck: supple, no lymphadenopathy, no midline deviation  Heart: rrr, no m/r/g  Lungs: CTA b/l, no rales/ronchi/wheezes  Abd: soft, nontender, non-distended, no rebound or guarding  Ext: no clubbing/cyanosis/edema  Neuro: sensation and muscle strength intact b/l, no focal weakness or sensory loss, sits self up in bed from reclined position to speak and interact for exam

## 2024-10-07 LAB
-  AMPICILLIN/SULBACTAM: SIGNIFICANT CHANGE UP
-  AMPICILLIN: SIGNIFICANT CHANGE UP
-  AZTREONAM: SIGNIFICANT CHANGE UP
-  CEFAZOLIN: SIGNIFICANT CHANGE UP
-  CEFEPIME: SIGNIFICANT CHANGE UP
-  CEFTRIAXONE: SIGNIFICANT CHANGE UP
-  CEFUROXIME: SIGNIFICANT CHANGE UP
-  CIPROFLOXACIN: SIGNIFICANT CHANGE UP
-  ERTAPENEM: SIGNIFICANT CHANGE UP
-  GENTAMICIN: SIGNIFICANT CHANGE UP
-  IMIPENEM: SIGNIFICANT CHANGE UP
-  LEVOFLOXACIN: SIGNIFICANT CHANGE UP
-  MEROPENEM: SIGNIFICANT CHANGE UP
-  NITROFURANTOIN: SIGNIFICANT CHANGE UP
-  PIPERACILLIN/TAZOBACTAM: SIGNIFICANT CHANGE UP
-  TOBRAMYCIN: SIGNIFICANT CHANGE UP
-  TRIMETHOPRIM/SULFAMETHOXAZOLE: SIGNIFICANT CHANGE UP
CULTURE RESULTS: ABNORMAL
METHOD TYPE: SIGNIFICANT CHANGE UP
ORGANISM # SPEC MICROSCOPIC CNT: ABNORMAL
ORGANISM # SPEC MICROSCOPIC CNT: SIGNIFICANT CHANGE UP
SPECIMEN SOURCE: SIGNIFICANT CHANGE UP

## 2024-10-08 ENCOUNTER — EMERGENCY (EMERGENCY)
Facility: HOSPITAL | Age: 89
LOS: 0 days | Discharge: HOME | End: 2024-10-08
Payer: MEDICARE

## 2024-10-08 VITALS
DIASTOLIC BLOOD PRESSURE: 90 MMHG | OXYGEN SATURATION: 98 % | RESPIRATION RATE: 17 BRPM | HEART RATE: 80 BPM | SYSTOLIC BLOOD PRESSURE: 170 MMHG

## 2024-10-08 VITALS
HEART RATE: 80 BPM | HEIGHT: 62 IN | DIASTOLIC BLOOD PRESSURE: 91 MMHG | SYSTOLIC BLOOD PRESSURE: 158 MMHG | WEIGHT: 126.99 LBS | RESPIRATION RATE: 16 BRPM | OXYGEN SATURATION: 99 % | TEMPERATURE: 98 F

## 2024-10-08 DIAGNOSIS — R82.71 BACTERIURIA: ICD-10-CM

## 2024-10-08 DIAGNOSIS — I50.30 UNSPECIFIED DIASTOLIC (CONGESTIVE) HEART FAILURE: ICD-10-CM

## 2024-10-08 DIAGNOSIS — I11.0 HYPERTENSIVE HEART DISEASE WITH HEART FAILURE: ICD-10-CM

## 2024-10-08 DIAGNOSIS — E78.5 HYPERLIPIDEMIA, UNSPECIFIED: ICD-10-CM

## 2024-10-08 DIAGNOSIS — R79.9 ABNORMAL FINDING OF BLOOD CHEMISTRY, UNSPECIFIED: ICD-10-CM

## 2024-10-08 DIAGNOSIS — Z98.890 OTHER SPECIFIED POSTPROCEDURAL STATES: Chronic | ICD-10-CM

## 2024-10-08 PROCEDURE — 99283 EMERGENCY DEPT VISIT LOW MDM: CPT

## 2024-10-08 RX ORDER — HYDRALAZINE HYDROCHLORIDE 100 MG/1
25 TABLET ORAL ONCE
Refills: 0 | Status: COMPLETED | OUTPATIENT
Start: 2024-10-08 | End: 2024-10-08

## 2024-10-08 RX ADMIN — HYDRALAZINE HYDROCHLORIDE 25 MILLIGRAM(S): 100 TABLET ORAL at 19:58

## 2024-10-08 NOTE — ED ADULT TRIAGE NOTE - AS TEMP SITE
oral Implemented All Universal Safety Interventions:  [place] to call system. Call bell, personal items and telephone within reach. Instruct patient to call for assistance. Room bathroom lighting operational. Non-slip footwear when patient is off stretcher. Physically safe environment: no spills, clutter or unnecessary equipment. Stretcher in lowest position, wheels locked, appropriate side rails in place.

## 2024-10-08 NOTE — ED ADULT TRIAGE NOTE - CHIEF COMPLAINT QUOTE
BRENT from [facility] for +e.coli in u/c, as per emt, doctor called the facility for abnormal lab result. patient denies gu symptoms at this time, complaining of dry cough that does not go away.

## 2024-10-08 NOTE — ED ADULT NURSE NOTE - NSFALLHARMRISKINTERV_ED_ALL_ED

## 2024-10-08 NOTE — ED PROVIDER NOTE - PHYSICAL EXAMINATION
PHYSICAL EXAM:    GENERAL: Alert, appears stated age, well appearing, non-toxic  SKIN: Warm, and dry. MMM.   HEAD: NC, AT  EYE: Normal lids/conjunctiva  ENT: Normal hearing, patent oropharynx   NECK: +supple. No meningismus, or JVD  Pulm: Bilateral BS, normal resp effort, no wheezes, stridor, or retractions  CV: RRR, no M/R/G, 2+and = radial pulses  Abd: soft, non-tender, non-distended, no rebound/guarding. no CVA tenderness.   Mskel: no erythema, cyanosis, edema. no calf tenderness  Neuro: AAOx3, 5/5 strength throughout.

## 2024-10-08 NOTE — ED PROVIDER NOTE - NSDCPRINTRESULTS_ED_ALL_ED
(2) cough or sneeze
Patient requests all Lab, Cardiology, and Radiology Results on their Discharge Instructions

## 2024-10-08 NOTE — ED ADULT NURSE NOTE - OBJECTIVE STATEMENT
patient alert and oriented x4, came in for abnormal lab results. pt is from [facility], got a call today to come to ER due to ecoli being positive. pt denies pain or discomfort at this time, denies nausea, vomiting,  symptoms. pmh of HTN, HLD. pt OOB with stand by assist. pt in no acute respiratory distress or discomfort at this time. fall precautions maintained. safety precautions maintained.

## 2024-10-08 NOTE — ED ADULT NURSE NOTE - CHIEF COMPLAINT QUOTE
BRENT from [facility] for +e.coli in u/c, as per emt, doctor called the facility for abnormal lab result. patient denies gu symptoms at this time, complaining of dry cough that does not go away.

## 2024-10-08 NOTE — ED PROVIDER NOTE - CLINICAL SUMMARY MEDICAL DECISION MAKING FREE TEXT BOX
SHAHBAZ Hook:  89-year-old female with PMH HTN, HLD, HRpEF, SVT, sent in from [facility] for 10-49K ESBL on urine culture resistant to oral abx.   She denies dysuria, increased frequency of urination, decreased frequency of urination, hematuria, back pain, abdominal pain, weakness, fever, sweats, chills, n/v, any symptoms at all.   Discussed with infectious disease Dr. Bess, agrees this sounds like asymptomatic bacteriuria including with a negative initial UA.   no antibiotics are indicated at this time.   Discussed with JOSE LUIS Gill @ [facility], they are happy to receive pt back there.

## 2024-10-08 NOTE — CHART NOTE - NSCHARTNOTEFT_GEN_A_CORE
Pt is a resident of the [facility]. As per Britni the [phone] at the [facility] they received a call stating pt should return to the ER for an evaluation for the need for IVAB.  Per Quin if it is determined there is no need for IVAB then pt can return this evening to the  "sick bay" at the [facility]. Pt will require ambulette transportation back.

## 2024-10-08 NOTE — ED PROVIDER NOTE - NSFOLLOWUPINSTRUCTIONS_ED_ALL_ED_FT
THANK YOU FOR SENDING THIS PATIENT IN FOR FURTHER EVAL.   PLEASE DO NOT CHECK URINALYSIS/URINE CULTURE UNLESS CLINICALLY INDICATED (FEVER, AMS, SUPRAPUBIC PAIN, URINARY SYMPTOMS) AS IT WILL LIKELY COME UP AS POSITIVE FOR ESBL AND IN THE ABSENCE OF SYMPTOMS, NO INDICATION TO TREAT WITH ANTIBIOTICS.     Normal Exam    WHAT YOU NEED TO KNOW:    Your healthcare provider did not find a reason for your symptoms today. You may need to follow up with your healthcare provider or a specialist. He will work with you to try to find the cause of your symptoms. He may also run tests to find out more about your overall health.     DISCHARGE INSTRUCTIONS:    Follow up with your healthcare provider or a specialist as directed: Tell your healthcare provider about your symptoms. You may be given a complete physical exam and health checkup. Write down your questions so you remember to ask them during your visits.     Maintain a healthy lifestyle: Healthy foods and regular physical activity can improve your health. They also decrease your risk of heart disease, high blood pressure, and diabetes.     Get 30 minutes of activity every day most days of the week. Ask your healthcare provider which activities are best for you. You can do 30 minutes at once or spread your activity throughout the day to get the recommended amount.       Eat a variety of healthy foods. Healthy foods include whole-grain breads, low-fat dairy products, beans, lean meats, and fish. Eat fruits and vegetables every day, especially those that are green, orange, and red.      Maintain a healthy weight. Ask your healthcare provider how much you should weigh. Ask him to help you create a weight loss plan if you are overweight.       Limit alcohol. Women should limit alcohol to 1 drink a day. Men should limit alcohol to 2 drinks a day. A drink of alcohol is 12 ounces of beer, 5 ounces of wine, or 1½ ounces of liquor.     Do not smoke: If you smoke, it is never too late to quit. You lower your risk for many health problems if you quit. Ask your healthcare provider for information if you need help quitting.     Contact your healthcare provider if:     Your symptoms get worse, or you have new symptoms that bother you.       You have questions or concerns about your condition or care.      Your illness makes it difficult to follow a healthy diet.    Return to the emergency department if:     You have trouble breathing.       You have chest pain.       You feel lightheaded or faint.

## 2024-10-08 NOTE — ED PROVIDER NOTE - PATIENT PORTAL LINK FT
You can access the FollowMyHealth Patient Portal offered by  by registering at the following website: [email or web address]. By joining [employer]’s FollowMyHealth portal, you will also be able to view your health information using other applications (apps) compatible with our system.

## 2025-05-26 NOTE — ED ADULT TRIAGE NOTE - WEIGHT METHOD
Replace mg  keep mg >2 and k >2  Echo ef 55% with mild to moderate mr  Tsh and t4 wnl  After mg replaced to be d/obdulia and follow up in office for [facility]OT  patient understands plan and will follow up  Tobacco cessation discussed  we cannot increase metoprolol  due to soft b/p stated